# Patient Record
Sex: FEMALE | Race: WHITE | NOT HISPANIC OR LATINO | Employment: FULL TIME | URBAN - NONMETROPOLITAN AREA
[De-identification: names, ages, dates, MRNs, and addresses within clinical notes are randomized per-mention and may not be internally consistent; named-entity substitution may affect disease eponyms.]

---

## 2023-03-20 DIAGNOSIS — L98.9 SKIN LESION: Primary | ICD-10-CM

## 2024-01-04 ENCOUNTER — OFFICE VISIT (OUTPATIENT)
Dept: URGENT CARE | Facility: CLINIC | Age: 34
End: 2024-01-04
Payer: MEDICAID

## 2024-01-04 VITALS
HEART RATE: 72 BPM | RESPIRATION RATE: 16 BRPM | BODY MASS INDEX: 31.1 KG/M2 | OXYGEN SATURATION: 99 % | TEMPERATURE: 98.1 F | WEIGHT: 210 LBS | DIASTOLIC BLOOD PRESSURE: 128 MMHG | HEIGHT: 69 IN | SYSTOLIC BLOOD PRESSURE: 230 MMHG

## 2024-01-04 DIAGNOSIS — I10 UNCONTROLLED HYPERTENSION: ICD-10-CM

## 2024-01-04 DIAGNOSIS — J02.9 ACUTE PHARYNGITIS, UNSPECIFIED ETIOLOGY: Primary | ICD-10-CM

## 2024-01-04 PROCEDURE — 99213 OFFICE O/P EST LOW 20 MIN: CPT | Mod: 25 | Performed by: PHYSICIAN ASSISTANT

## 2024-01-04 PROCEDURE — 99212 OFFICE O/P EST SF 10 MIN: CPT | Mod: 25 | Performed by: PHYSICIAN ASSISTANT

## 2024-01-04 RX ORDER — AMOXICILLIN 500 MG/1
500 CAPSULE ORAL 2 TIMES DAILY
Qty: 20 CAPSULE | Refills: 0 | Status: SHIPPED | OUTPATIENT
Start: 2024-01-04 | End: 2024-01-14

## 2024-01-04 RX ORDER — LOSARTAN POTASSIUM 25 MG/1
25 TABLET ORAL DAILY
COMMUNITY

## 2024-01-04 NOTE — PROGRESS NOTES
[facility] [place] NOTE:      Name: Vipul Kennedy, 33 y.o.    CSN:[id number]   MRN:[id number]    PCP: Erika Ambrocio, WILLIAM-CNP    ALL:  No Known Allergies    History:    Chief Complaint: URI (SORE THROAT X TODAY)    Encounter Date: 2024  15:39hrs    HPI: The history was obtained from the patient. Vipul is a 33 y.o. female, who presents with a chief complaint of URI (SORE THROAT X TODAY)     Patient also mentions that she is not taking her blood pressure medication 1 out of negligence to adherence but also to because that she is just forgetful allegedly and not able to remind yourself to do so.    She denies any chest pain visual disturbance, denies any urinary complaints, denies any fever or chills.    PMHx:    Past Medical History:   Diagnosis Date    Dorsalgia, unspecified 2020    Upper back pain    Lumbago with sciatica, left side 2020    Chronic left-sided low back pain with left-sided sciatica    Lumbago with sciatica, right side 2020    Acute right-sided low back pain with right-sided sciatica    Other chest pain 2020    Atypical chest pain    Other specified health status     No pertinent past medical history    Personal history of other specified conditions 2019    History of palpitations    Personal history of other specified conditions 10/29/2020    History of right flank pain              Current Outpatient Medications   Medication Sig Dispense Refill    losartan (Cozaar) 25 mg tablet Take 1 tablet (25 mg) by mouth once daily.      amoxicillin (Amoxil) 500 mg capsule Take 1 capsule (500 mg) by mouth 2 times a day for 10 days. 20 capsule 0     No current facility-administered medications for this visit.         PMSx:    Past Surgical History:   Procedure Laterality Date    OTHER SURGICAL HISTORY  2019     section    OTHER SURGICAL HISTORY  2019    Tubal ligation bilateral       Fam Hx: No family history on file.    SOC. Hx:   "   Social History     Socioeconomic History    Marital status: Single     Spouse name: Not on file    Number of children: Not on file    Years of education: Not on file    Highest education level: Not on file   Occupational History    Not on file   Tobacco Use    Smoking status: Never    Smokeless tobacco: Never   Substance and Sexual Activity    Alcohol use: Yes    Drug use: Never    Sexual activity: Not on file   Other Topics Concern    Not on file   Social History Narrative    Not on file     Social Determinants of Health     Financial Resource Strain: Not on file   Food Insecurity: Not on file   Transportation Needs: Not on file   Physical Activity: Not on file   Stress: Not on file   Social Connections: Not on file   Intimate Partner Violence: Not on file   Housing Stability: Not on file         Vitals:    01/04/24 1536 01/04/24 1552   BP: (!) 215/146 (!) 230/128   BP Location:  Right arm   Patient Position:  Sitting   BP Cuff Size:  Adult   Pulse: 72    Resp: 16    Temp: 36.7 °C (98.1 °F)    SpO2: 99%    Weight: 95.3 kg (210 lb)    Height: 1.753 m (5' 9\")          95.3 kg (210 lb)          Physical Exam  Constitutional:       Appearance: Normal appearance. She is normal weight.   HENT:      Head: Normocephalic and atraumatic.      Nose: Nose normal.      Mouth/Throat:      Mouth: Mucous membranes are moist.      Pharynx: Uvula midline. Pharyngeal swelling, oropharyngeal exudate, posterior oropharyngeal erythema and uvula swelling present.      Tonsils: Tonsillar exudate present. No tonsillar abscesses. 1+ on the right. 1+ on the left.   Eyes:      Extraocular Movements: Extraocular movements intact.   Cardiovascular:      Rate and Rhythm: Normal rate and regular rhythm.   Pulmonary:      Effort: Pulmonary effort is normal.      Breath sounds: Normal breath sounds.   Abdominal:      General: Abdomen is flat.   Musculoskeletal:         General: Normal range of motion.      Cervical back: Normal range of motion " and neck supple.   Skin:     General: Skin is warm.   Neurological:      Mental Status: She is alert and oriented to person, place, and time.   Psychiatric:         Behavior: Behavior normal.            COURSE/MEDICAL DECISION MAKING:    Vipul is a 33 y.o., who presents with a working diagnosis of   1. Acute pharyngitis, unspecified etiology    2. Uncontrolled hypertension     with a differential to include: Influenza, parainfluenza, rhinovirus, adenovirus, metapneumovirus, coronavirus, COVID-19, postnasal drip, strep pharyngitis, GERD, retropharyngeal abscess, tonsillitis, adenitis, seasonal allergies            Dani Palomares PA-C   Advanced Practice Provider  [facility]

## 2024-07-02 ENCOUNTER — APPOINTMENT (OUTPATIENT)
Dept: PRIMARY CARE | Facility: CLINIC | Age: 34
End: 2024-07-02
Payer: MEDICAID

## 2024-07-02 VITALS
HEART RATE: 69 BPM | SYSTOLIC BLOOD PRESSURE: 184 MMHG | WEIGHT: 230.2 LBS | HEIGHT: 67 IN | BODY MASS INDEX: 36.13 KG/M2 | DIASTOLIC BLOOD PRESSURE: 139 MMHG

## 2024-07-02 DIAGNOSIS — R60.0 LOCALIZED EDEMA: ICD-10-CM

## 2024-07-02 DIAGNOSIS — I10 PRIMARY HYPERTENSION: Primary | ICD-10-CM

## 2024-07-02 DIAGNOSIS — Z13.6 SCREENING FOR CARDIOVASCULAR CONDITION: ICD-10-CM

## 2024-07-02 PROCEDURE — 99204 OFFICE O/P NEW MOD 45 MIN: CPT

## 2024-07-02 PROCEDURE — 3080F DIAST BP >= 90 MM HG: CPT

## 2024-07-02 PROCEDURE — 3077F SYST BP >= 140 MM HG: CPT

## 2024-07-02 PROCEDURE — 1036F TOBACCO NON-USER: CPT

## 2024-07-02 RX ORDER — LOSARTAN POTASSIUM 25 MG/1
25 TABLET ORAL DAILY
Qty: 30 TABLET | Refills: 3 | Status: SHIPPED | OUTPATIENT
Start: 2024-07-02 | End: 2024-10-30

## 2024-07-02 RX ORDER — FUROSEMIDE 20 MG/1
20 TABLET ORAL DAILY
Qty: 30 TABLET | Refills: 11 | Status: SHIPPED | OUTPATIENT
Start: 2024-07-02 | End: 2025-07-02

## 2024-07-02 ASSESSMENT — ENCOUNTER SYMPTOMS
VOMITING: 0
CHILLS: 0
NAUSEA: 0
HEMATURIA: 0
EYE REDNESS: 0
DIFFICULTY URINATING: 0
HALLUCINATIONS: 0
CONSTIPATION: 0
SINUS PAIN: 0
HEADACHES: 0
COUGH: 0
NUMBNESS: 0
SORE THROAT: 0
EYE PAIN: 0
WEAKNESS: 0
SINUS PRESSURE: 0
PALPITATIONS: 0
COLOR CHANGE: 0
DIZZINESS: 0
ABDOMINAL PAIN: 0
FEVER: 0
FLANK PAIN: 0
EYE ITCHING: 0
DIARRHEA: 0
SHORTNESS OF BREATH: 0
FATIGUE: 0
BACK PAIN: 0

## 2024-07-02 NOTE — PROGRESS NOTES
"Subjective   Patient ID: Vipul Kennedy is a 33 y.o. female who presents for Establish Care.    HPI   Here to establish care as new patient   Reports her ankles and feet swell  Quit smoking pot in October and has noticed weight gain and fluid retention since then.  Reports she has tried hydrochlorothiazide in the past with no improvement.     Review of Systems   Constitutional:  Negative for chills, fatigue and fever.   HENT:  Negative for congestion, sinus pressure, sinus pain and sore throat.    Eyes:  Negative for pain, redness and itching.   Respiratory:  Negative for cough and shortness of breath.    Cardiovascular:  Positive for leg swelling. Negative for chest pain and palpitations.   Gastrointestinal:  Negative for abdominal pain, constipation, diarrhea, nausea and vomiting.   Endocrine: Negative for cold intolerance and heat intolerance.   Genitourinary:  Negative for difficulty urinating, flank pain and hematuria.   Musculoskeletal:  Negative for back pain and gait problem.   Skin:  Negative for color change.   Neurological:  Negative for dizziness, weakness, numbness and headaches.   Psychiatric/Behavioral:  Negative for hallucinations and suicidal ideas.        Objective   BP (!) 184/139 (Patient Position: Sitting)   Pulse 69   Ht 1.703 m (5' 7.03\")   Wt 104 kg (230 lb 3.2 oz)   BMI 36.02 kg/m²     Physical Exam  Vitals and nursing note reviewed.   Constitutional:       Appearance: Normal appearance.   Cardiovascular:      Rate and Rhythm: Normal rate and regular rhythm.   Pulmonary:      Effort: Pulmonary effort is normal.      Breath sounds: Normal breath sounds.   Abdominal:      General: Abdomen is flat. Bowel sounds are normal.      Palpations: Abdomen is soft.   Skin:     General: Skin is warm and dry.      Capillary Refill: Capillary refill takes less than 2 seconds.   Neurological:      Mental Status: She is alert and oriented to person, place, and time.   Psychiatric:         Mood and " Affect: Mood normal.         Behavior: Behavior normal.         Assessment/Plan   Problem List Items Addressed This Visit    None  Visit Diagnoses         Codes    Primary hypertension    -  Primary I10    Relevant Medications    losartan (Cozaar) 25 mg tablet    Other Relevant Orders    CBC and Auto Differential    Comprehensive Metabolic Panel    Lipid Panel    Localized edema     R60.0    Relevant Medications    furosemide (Lasix) 20 mg tablet    Other Relevant Orders    TSH with reflex to Free T4 if abnormal    Screening for cardiovascular condition     Z13.6    Relevant Orders    CBC and Auto Differential    Hemoglobin A1C             HTN:   -Refill losartan 25 mg     Edema  -Furosemide 20mg PRN    Diet  -reports she has changed her diet and is eating vegetables and meat with only 5 lbs weight loss  -lab work ordered  -pending results discuss Ozempic/Trulicity

## 2024-07-15 ENCOUNTER — LAB (OUTPATIENT)
Dept: LAB | Facility: LAB | Age: 34
End: 2024-07-15
Payer: MEDICAID

## 2024-07-15 DIAGNOSIS — I10 PRIMARY HYPERTENSION: ICD-10-CM

## 2024-07-15 DIAGNOSIS — R60.0 LOCALIZED EDEMA: ICD-10-CM

## 2024-07-15 DIAGNOSIS — Z13.6 SCREENING FOR CARDIOVASCULAR CONDITION: ICD-10-CM

## 2024-07-15 LAB
ALBUMIN SERPL BCP-MCNC: 4.1 G/DL (ref 3.4–5)
ALP SERPL-CCNC: 45 U/L (ref 33–110)
ALT SERPL W P-5'-P-CCNC: 36 U/L (ref 7–45)
ANION GAP SERPL CALC-SCNC: 11 MMOL/L (ref 10–20)
AST SERPL W P-5'-P-CCNC: 19 U/L (ref 9–39)
BASOPHILS # BLD AUTO: 0.06 X10*3/UL (ref 0–0.1)
BASOPHILS NFR BLD AUTO: 0.7 %
BILIRUB SERPL-MCNC: 0.6 MG/DL (ref 0–1.2)
BUN SERPL-MCNC: 10 MG/DL (ref 6–23)
CALCIUM SERPL-MCNC: 9 MG/DL (ref 8.6–10.3)
CHLORIDE SERPL-SCNC: 104 MMOL/L (ref 98–107)
CHOLEST SERPL-MCNC: 189 MG/DL (ref 0–199)
CHOLESTEROL/HDL RATIO: 2.3
CO2 SERPL-SCNC: 26 MMOL/L (ref 21–32)
CREAT SERPL-MCNC: 0.74 MG/DL (ref 0.5–1.05)
EGFRCR SERPLBLD CKD-EPI 2021: >90 ML/MIN/1.73M*2
EOSINOPHIL # BLD AUTO: 0.28 X10*3/UL (ref 0–0.7)
EOSINOPHIL NFR BLD AUTO: 3.5 %
ERYTHROCYTE [DISTWIDTH] IN BLOOD BY AUTOMATED COUNT: 11.9 % (ref 11.5–14.5)
EST. AVERAGE GLUCOSE BLD GHB EST-MCNC: 97 MG/DL
GLUCOSE SERPL-MCNC: 94 MG/DL (ref 74–99)
HBA1C MFR BLD: 5 %
HCT VFR BLD AUTO: 41.8 % (ref 36–46)
HDLC SERPL-MCNC: 83 MG/DL
HGB BLD-MCNC: 13.7 G/DL (ref 12–16)
IMM GRANULOCYTES # BLD AUTO: 0.03 X10*3/UL (ref 0–0.7)
IMM GRANULOCYTES NFR BLD AUTO: 0.4 % (ref 0–0.9)
LDLC SERPL CALC-MCNC: 80 MG/DL
LYMPHOCYTES # BLD AUTO: 2.48 X10*3/UL (ref 1.2–4.8)
LYMPHOCYTES NFR BLD AUTO: 30.9 %
MCH RBC QN AUTO: 30.2 PG (ref 26–34)
MCHC RBC AUTO-ENTMCNC: 32.8 G/DL (ref 32–36)
MCV RBC AUTO: 92 FL (ref 80–100)
MONOCYTES # BLD AUTO: 0.66 X10*3/UL (ref 0.1–1)
MONOCYTES NFR BLD AUTO: 8.2 %
NEUTROPHILS # BLD AUTO: 4.51 X10*3/UL (ref 1.2–7.7)
NEUTROPHILS NFR BLD AUTO: 56.3 %
NON HDL CHOLESTEROL: 106 MG/DL (ref 0–149)
NRBC BLD-RTO: 0 /100 WBCS (ref 0–0)
PLATELET # BLD AUTO: 278 X10*3/UL (ref 150–450)
POTASSIUM SERPL-SCNC: 4.5 MMOL/L (ref 3.5–5.3)
PROT SERPL-MCNC: 6.8 G/DL (ref 6.4–8.2)
RBC # BLD AUTO: 4.53 X10*6/UL (ref 4–5.2)
SODIUM SERPL-SCNC: 136 MMOL/L (ref 136–145)
TRIGL SERPL-MCNC: 131 MG/DL (ref 0–149)
TSH SERPL-ACNC: 0.99 MIU/L (ref 0.44–3.98)
VLDL: 26 MG/DL (ref 0–40)
WBC # BLD AUTO: 8 X10*3/UL (ref 4.4–11.3)

## 2024-07-15 PROCEDURE — 80053 COMPREHEN METABOLIC PANEL: CPT

## 2024-07-15 PROCEDURE — 85025 COMPLETE CBC W/AUTO DIFF WBC: CPT

## 2024-07-15 PROCEDURE — 83036 HEMOGLOBIN GLYCOSYLATED A1C: CPT

## 2024-07-15 PROCEDURE — 84443 ASSAY THYROID STIM HORMONE: CPT

## 2024-07-15 PROCEDURE — 36415 COLL VENOUS BLD VENIPUNCTURE: CPT

## 2024-07-15 PROCEDURE — 80061 LIPID PANEL: CPT

## 2024-10-01 ENCOUNTER — APPOINTMENT (OUTPATIENT)
Dept: PRIMARY CARE | Facility: CLINIC | Age: 34
End: 2024-10-01
Payer: MEDICAID

## 2024-10-01 VITALS
HEART RATE: 81 BPM | HEIGHT: 67 IN | WEIGHT: 242.2 LBS | BODY MASS INDEX: 38.01 KG/M2 | DIASTOLIC BLOOD PRESSURE: 123 MMHG | SYSTOLIC BLOOD PRESSURE: 170 MMHG

## 2024-10-01 DIAGNOSIS — R60.0 PERIPHERAL EDEMA: ICD-10-CM

## 2024-10-01 DIAGNOSIS — R60.0 LOCALIZED EDEMA: ICD-10-CM

## 2024-10-01 DIAGNOSIS — E66.9 OBESITY (BMI 35.0-39.9 WITHOUT COMORBIDITY): Primary | ICD-10-CM

## 2024-10-01 DIAGNOSIS — I10 PRIMARY HYPERTENSION: ICD-10-CM

## 2024-10-01 PROCEDURE — 99214 OFFICE O/P EST MOD 30 MIN: CPT

## 2024-10-01 PROCEDURE — 1036F TOBACCO NON-USER: CPT

## 2024-10-01 PROCEDURE — 3008F BODY MASS INDEX DOCD: CPT

## 2024-10-01 PROCEDURE — 3080F DIAST BP >= 90 MM HG: CPT

## 2024-10-01 PROCEDURE — 3077F SYST BP >= 140 MM HG: CPT

## 2024-10-01 RX ORDER — FUROSEMIDE 20 MG/1
20 TABLET ORAL DAILY
Qty: 30 TABLET | Refills: 11 | Status: SHIPPED | OUTPATIENT
Start: 2024-10-01 | End: 2025-10-01

## 2024-10-01 RX ORDER — SEMAGLUTIDE 0.25 MG/.5ML
0.25 INJECTION, SOLUTION SUBCUTANEOUS WEEKLY
Qty: 2 ML | Refills: 0 | Status: SHIPPED | OUTPATIENT
Start: 2024-10-01 | End: 2024-10-23

## 2024-10-01 RX ORDER — LOSARTAN POTASSIUM 50 MG/1
50 TABLET ORAL DAILY
Qty: 100 TABLET | Refills: 3 | Status: SHIPPED | OUTPATIENT
Start: 2024-10-01 | End: 2025-11-05

## 2024-10-01 ASSESSMENT — ENCOUNTER SYMPTOMS
FEVER: 0
COUGH: 0
SHORTNESS OF BREATH: 0
FATIGUE: 0
CHILLS: 0
PALPITATIONS: 0
HEADACHES: 0

## 2024-10-01 NOTE — PROGRESS NOTES
"Subjective   Patient ID: Vipul Kennedy is a 34 y.o. female who presents for Follow-up.    HPI   Here today for 3 month follow up   Reports the peripheral edema has drastically improved with the lasix.   High blood pressure still, increased her losartan and encouraged her to check her BP at home.   Review of Systems   Constitutional:  Negative for chills, fatigue and fever.   Respiratory:  Negative for cough and shortness of breath.    Cardiovascular:  Negative for chest pain, palpitations and leg swelling.   Neurological:  Negative for headaches.       Objective   BP (!) 170/123 (Patient Position: Sitting)   Pulse 81   Ht 1.702 m (5' 7\")   Wt 110 kg (242 lb 3.2 oz)   BMI 37.93 kg/m²     Physical Exam  Cardiovascular:      Rate and Rhythm: Regular rhythm.      Heart sounds: Normal heart sounds.   Pulmonary:      Breath sounds: Normal breath sounds.   Abdominal:      General: Bowel sounds are normal.   Skin:     Capillary Refill: Capillary refill takes less than 2 seconds.   Neurological:      Mental Status: She is alert and oriented to person, place, and time.         Assessment/Plan   Problem List Items Addressed This Visit    None  Visit Diagnoses         Codes    Obesity (BMI 35.0-39.9 without comorbidity)    -  Primary E66.9    Relevant Medications    semaglutide, weight loss, (Wegovy) 0.25 mg/0.5 mL pen injector    Peripheral edema     R60.0    Primary hypertension     I10    Relevant Medications    losartan (Cozaar) 50 mg tablet    Localized edema     R60.0    Relevant Medications    furosemide (Lasix) 20 mg tablet             Weight loss  -Pt would like to try Wegovy for weight loss  -Educated on healthy diet and exercise     Hypertension  -Increase to 50mg daily     Peripheral edema  -Lasix 20 mg PRN   "

## 2025-01-07 ENCOUNTER — APPOINTMENT (OUTPATIENT)
Dept: PRIMARY CARE | Facility: CLINIC | Age: 35
End: 2025-01-07
Payer: MEDICAID

## 2025-01-07 DIAGNOSIS — K21.9 GASTROESOPHAGEAL REFLUX DISEASE WITHOUT ESOPHAGITIS: Primary | ICD-10-CM

## 2025-01-07 RX ORDER — OMEPRAZOLE 20 MG/1
20 CAPSULE, DELAYED RELEASE ORAL 2 TIMES DAILY
Qty: 60 CAPSULE | Refills: 0 | Status: SHIPPED | OUTPATIENT
Start: 2025-01-07 | End: 2025-02-06

## 2025-01-07 ASSESSMENT — ENCOUNTER SYMPTOMS
FATIGUE: 0
COUGH: 0
HEADACHES: 0
CHILLS: 0
ABDOMINAL PAIN: 0
FEVER: 0
LIGHT-HEADEDNESS: 0
PALPITATIONS: 0
SHORTNESS OF BREATH: 0

## 2025-01-07 NOTE — PROGRESS NOTES
Subjective   Patient ID: Vipul Kennedy is a 34 y.o. female who presents for No chief complaint on file..    HPI   Pt here for telephone encounter to discuss  Pt and physician are at two separate locations.   Pt was verbally consented for the encounter  . Phone call today regarding 3 month follow up   Acute concerns with GERD regardless of what she eats. Discussed foods that make the acid reflux worse. We will begin omeprazole for 2 weeks, if she still has reflux after omeprazole then we will send for EGD.   HTN, we increased her BP last visit, she will check her BP and let us know her readings, denies any chest pain, SOB or headaches.   Denies other concerns   Review of Systems   Constitutional:  Negative for chills, fatigue and fever.   Respiratory:  Negative for cough and shortness of breath.    Cardiovascular:  Negative for chest pain, palpitations and leg swelling.   Gastrointestinal:  Negative for abdominal pain.   Neurological:  Negative for light-headedness and headaches.       Objective   There were no vitals taken for this visit.    Physical Exam  Neurological:      Mental Status: She is alert and oriented to person, place, and time.         Assessment/Plan   Problem List Items Addressed This Visit    None  Visit Diagnoses         Codes    Gastroesophageal reflux disease without esophagitis    -  Primary K21.9    Relevant Medications    omeprazole (PriLOSEC) 20 mg DR capsule          GERD  -Begin omeprazole for 2 weeks  -If reflux persist will send for EGD   -Discussed foods that make reflux worse    HTN  -continue losartan 50 mg   -check BP and keep a log

## 2025-02-11 ENCOUNTER — APPOINTMENT (OUTPATIENT)
Dept: RADIOLOGY | Facility: HOSPITAL | Age: 35
End: 2025-02-11
Payer: MEDICAID

## 2025-02-11 ENCOUNTER — APPOINTMENT (OUTPATIENT)
Dept: CARDIOLOGY | Facility: HOSPITAL | Age: 35
End: 2025-02-11
Payer: MEDICAID

## 2025-02-11 ENCOUNTER — HOSPITAL ENCOUNTER (INPATIENT)
Facility: HOSPITAL | Age: 35
LOS: 3 days | Discharge: HOME | End: 2025-02-14
Attending: EMERGENCY MEDICINE | Admitting: STUDENT IN AN ORGANIZED HEALTH CARE EDUCATION/TRAINING PROGRAM
Payer: MEDICAID

## 2025-02-11 DIAGNOSIS — I16.0 HYPERTENSIVE URGENCY: ICD-10-CM

## 2025-02-11 DIAGNOSIS — I10 ESSENTIAL (PRIMARY) HYPERTENSION: ICD-10-CM

## 2025-02-11 DIAGNOSIS — I16.1 HYPERTENSIVE EMERGENCY: Primary | ICD-10-CM

## 2025-02-11 PROBLEM — R11.2 INTRACTABLE NAUSEA AND VOMITING: Status: ACTIVE | Noted: 2025-02-11

## 2025-02-11 LAB
ANION GAP SERPL CALC-SCNC: 19 MMOL/L (ref 10–20)
APPEARANCE UR: CLEAR
BASOPHILS # BLD AUTO: 0.06 X10*3/UL (ref 0–0.1)
BASOPHILS NFR BLD AUTO: 0.4 %
BILIRUB UR STRIP.AUTO-MCNC: NEGATIVE MG/DL
BUN SERPL-MCNC: 12 MG/DL (ref 6–23)
CALCIUM SERPL-MCNC: 10.3 MG/DL (ref 8.6–10.3)
CARDIAC TROPONIN I PNL SERPL HS: 7 NG/L (ref 0–13)
CARDIAC TROPONIN I PNL SERPL HS: 7 NG/L (ref 0–13)
CHLORIDE SERPL-SCNC: 101 MMOL/L (ref 98–107)
CO2 SERPL-SCNC: 20 MMOL/L (ref 21–32)
COLOR UR: ABNORMAL
CREAT SERPL-MCNC: 0.91 MG/DL (ref 0.5–1.05)
EGFRCR SERPLBLD CKD-EPI 2021: 86 ML/MIN/1.73M*2
EOSINOPHIL # BLD AUTO: 0.02 X10*3/UL (ref 0–0.7)
EOSINOPHIL NFR BLD AUTO: 0.1 %
ERYTHROCYTE [DISTWIDTH] IN BLOOD BY AUTOMATED COUNT: 12 % (ref 11.5–14.5)
FLUAV RNA RESP QL NAA+PROBE: NOT DETECTED
FLUBV RNA RESP QL NAA+PROBE: NOT DETECTED
GLUCOSE SERPL-MCNC: 130 MG/DL (ref 74–99)
GLUCOSE UR STRIP.AUTO-MCNC: ABNORMAL MG/DL
HCG UR QL IA.RAPID: NEGATIVE
HCT VFR BLD AUTO: 42.8 % (ref 36–46)
HGB BLD-MCNC: 15.1 G/DL (ref 12–16)
HOLD SPECIMEN: NORMAL
IMM GRANULOCYTES # BLD AUTO: 0.06 X10*3/UL (ref 0–0.7)
IMM GRANULOCYTES NFR BLD AUTO: 0.4 % (ref 0–0.9)
KETONES UR STRIP.AUTO-MCNC: ABNORMAL MG/DL
LEUKOCYTE ESTERASE UR QL STRIP.AUTO: NEGATIVE
LYMPHOCYTES # BLD AUTO: 1.75 X10*3/UL (ref 1.2–4.8)
LYMPHOCYTES NFR BLD AUTO: 12.9 %
MCH RBC QN AUTO: 30.6 PG (ref 26–34)
MCHC RBC AUTO-ENTMCNC: 35.3 G/DL (ref 32–36)
MCV RBC AUTO: 87 FL (ref 80–100)
MONOCYTES # BLD AUTO: 0.84 X10*3/UL (ref 0.1–1)
MONOCYTES NFR BLD AUTO: 6.2 %
MUCOUS THREADS #/AREA URNS AUTO: NORMAL /LPF
NEUTROPHILS # BLD AUTO: 10.86 X10*3/UL (ref 1.2–7.7)
NEUTROPHILS NFR BLD AUTO: 80 %
NITRITE UR QL STRIP.AUTO: NEGATIVE
NRBC BLD-RTO: 0 /100 WBCS (ref 0–0)
PH UR STRIP.AUTO: 8 [PH]
PLATELET # BLD AUTO: 325 X10*3/UL (ref 150–450)
POTASSIUM SERPL-SCNC: 3.3 MMOL/L (ref 3.5–5.3)
PROT UR STRIP.AUTO-MCNC: ABNORMAL MG/DL
RBC # BLD AUTO: 4.93 X10*6/UL (ref 4–5.2)
RBC # UR STRIP.AUTO: NEGATIVE MG/DL
RBC #/AREA URNS AUTO: NORMAL /HPF
SARS-COV-2 RNA RESP QL NAA+PROBE: NOT DETECTED
SODIUM SERPL-SCNC: 137 MMOL/L (ref 136–145)
SP GR UR STRIP.AUTO: 1.02
SQUAMOUS #/AREA URNS AUTO: NORMAL /HPF
UROBILINOGEN UR STRIP.AUTO-MCNC: NORMAL MG/DL
WBC # BLD AUTO: 13.6 X10*3/UL (ref 4.4–11.3)
WBC #/AREA URNS AUTO: NORMAL /HPF

## 2025-02-11 PROCEDURE — 71045 X-RAY EXAM CHEST 1 VIEW: CPT

## 2025-02-11 PROCEDURE — 81001 URINALYSIS AUTO W/SCOPE: CPT | Performed by: EMERGENCY MEDICINE

## 2025-02-11 PROCEDURE — 36415 COLL VENOUS BLD VENIPUNCTURE: CPT | Performed by: EMERGENCY MEDICINE

## 2025-02-11 PROCEDURE — 2500000004 HC RX 250 GENERAL PHARMACY W/ HCPCS (ALT 636 FOR OP/ED): Performed by: STUDENT IN AN ORGANIZED HEALTH CARE EDUCATION/TRAINING PROGRAM

## 2025-02-11 PROCEDURE — 84484 ASSAY OF TROPONIN QUANT: CPT | Performed by: EMERGENCY MEDICINE

## 2025-02-11 PROCEDURE — 93005 ELECTROCARDIOGRAM TRACING: CPT

## 2025-02-11 PROCEDURE — 2020000001 HC ICU ROOM DAILY

## 2025-02-11 PROCEDURE — 2500000001 HC RX 250 WO HCPCS SELF ADMINISTERED DRUGS (ALT 637 FOR MEDICARE OP): Performed by: EMERGENCY MEDICINE

## 2025-02-11 PROCEDURE — 2500000001 HC RX 250 WO HCPCS SELF ADMINISTERED DRUGS (ALT 637 FOR MEDICARE OP): Performed by: STUDENT IN AN ORGANIZED HEALTH CARE EDUCATION/TRAINING PROGRAM

## 2025-02-11 PROCEDURE — 85025 COMPLETE CBC W/AUTO DIFF WBC: CPT | Performed by: EMERGENCY MEDICINE

## 2025-02-11 PROCEDURE — 99285 EMERGENCY DEPT VISIT HI MDM: CPT | Mod: 25 | Performed by: EMERGENCY MEDICINE

## 2025-02-11 PROCEDURE — 96366 THER/PROPH/DIAG IV INF ADDON: CPT

## 2025-02-11 PROCEDURE — 99223 1ST HOSP IP/OBS HIGH 75: CPT | Performed by: STUDENT IN AN ORGANIZED HEALTH CARE EDUCATION/TRAINING PROGRAM

## 2025-02-11 PROCEDURE — 71045 X-RAY EXAM CHEST 1 VIEW: CPT | Performed by: RADIOLOGY

## 2025-02-11 PROCEDURE — 80048 BASIC METABOLIC PNL TOTAL CA: CPT | Performed by: EMERGENCY MEDICINE

## 2025-02-11 PROCEDURE — 2550000001 HC RX 255 CONTRASTS: Performed by: EMERGENCY MEDICINE

## 2025-02-11 PROCEDURE — 81025 URINE PREGNANCY TEST: CPT | Performed by: EMERGENCY MEDICINE

## 2025-02-11 PROCEDURE — 96375 TX/PRO/DX INJ NEW DRUG ADDON: CPT

## 2025-02-11 PROCEDURE — 2500000004 HC RX 250 GENERAL PHARMACY W/ HCPCS (ALT 636 FOR OP/ED): Performed by: EMERGENCY MEDICINE

## 2025-02-11 PROCEDURE — 96361 HYDRATE IV INFUSION ADD-ON: CPT

## 2025-02-11 PROCEDURE — 70450 CT HEAD/BRAIN W/O DYE: CPT | Performed by: RADIOLOGY

## 2025-02-11 PROCEDURE — 87636 SARSCOV2 & INF A&B AMP PRB: CPT | Performed by: EMERGENCY MEDICINE

## 2025-02-11 PROCEDURE — 96376 TX/PRO/DX INJ SAME DRUG ADON: CPT

## 2025-02-11 PROCEDURE — 70450 CT HEAD/BRAIN W/O DYE: CPT

## 2025-02-11 PROCEDURE — 74177 CT ABD & PELVIS W/CONTRAST: CPT

## 2025-02-11 PROCEDURE — 96365 THER/PROPH/DIAG IV INF INIT: CPT

## 2025-02-11 PROCEDURE — 74177 CT ABD & PELVIS W/CONTRAST: CPT | Performed by: RADIOLOGY

## 2025-02-11 RX ORDER — METOCLOPRAMIDE HYDROCHLORIDE 5 MG/ML
10 INJECTION INTRAMUSCULAR; INTRAVENOUS ONCE
Status: COMPLETED | OUTPATIENT
Start: 2025-02-11 | End: 2025-02-11

## 2025-02-11 RX ORDER — ONDANSETRON HYDROCHLORIDE 2 MG/ML
4 INJECTION, SOLUTION INTRAVENOUS ONCE
Status: COMPLETED | OUTPATIENT
Start: 2025-02-11 | End: 2025-02-11

## 2025-02-11 RX ORDER — LABETALOL HYDROCHLORIDE 5 MG/ML
20 INJECTION, SOLUTION INTRAVENOUS ONCE
Status: COMPLETED | OUTPATIENT
Start: 2025-02-11 | End: 2025-02-11

## 2025-02-11 RX ORDER — TRAZODONE HYDROCHLORIDE 50 MG/1
50 TABLET ORAL NIGHTLY PRN
Status: DISCONTINUED | OUTPATIENT
Start: 2025-02-11 | End: 2025-02-14 | Stop reason: HOSPADM

## 2025-02-11 RX ORDER — NICARDIPINE HYDROCHLORIDE 0.2 MG/ML
2.5-15 INJECTION INTRAVENOUS CONTINUOUS
Status: DISCONTINUED | OUTPATIENT
Start: 2025-02-11 | End: 2025-02-11

## 2025-02-11 RX ORDER — POLYETHYLENE GLYCOL 3350 17 G/17G
17 POWDER, FOR SOLUTION ORAL DAILY PRN
Status: DISCONTINUED | OUTPATIENT
Start: 2025-02-11 | End: 2025-02-14 | Stop reason: HOSPADM

## 2025-02-11 RX ORDER — ONDANSETRON HYDROCHLORIDE 2 MG/ML
4 INJECTION, SOLUTION INTRAVENOUS EVERY 6 HOURS PRN
Status: DISCONTINUED | OUTPATIENT
Start: 2025-02-11 | End: 2025-02-14 | Stop reason: HOSPADM

## 2025-02-11 RX ORDER — HEPARIN SODIUM 5000 [USP'U]/ML
5000 INJECTION, SOLUTION INTRAVENOUS; SUBCUTANEOUS EVERY 8 HOURS SCHEDULED
Status: DISCONTINUED | OUTPATIENT
Start: 2025-02-11 | End: 2025-02-14 | Stop reason: HOSPADM

## 2025-02-11 RX ORDER — FUROSEMIDE 20 MG/1
20 TABLET ORAL DAILY
Status: DISCONTINUED | OUTPATIENT
Start: 2025-02-11 | End: 2025-02-12

## 2025-02-11 RX ORDER — LOSARTAN POTASSIUM 50 MG/1
50 TABLET ORAL DAILY
Status: DISCONTINUED | OUTPATIENT
Start: 2025-02-11 | End: 2025-02-12

## 2025-02-11 RX ORDER — POTASSIUM CHLORIDE 14.9 MG/ML
20 INJECTION INTRAVENOUS
Status: COMPLETED | OUTPATIENT
Start: 2025-02-11 | End: 2025-02-11

## 2025-02-11 RX ORDER — CLONIDINE HYDROCHLORIDE 0.1 MG/1
0.3 TABLET ORAL ONCE
Status: COMPLETED | OUTPATIENT
Start: 2025-02-11 | End: 2025-02-11

## 2025-02-11 RX ORDER — NICARDIPINE HYDROCHLORIDE 0.2 MG/ML
2.5-15 INJECTION INTRAVENOUS CONTINUOUS
Status: DISCONTINUED | OUTPATIENT
Start: 2025-02-11 | End: 2025-02-13

## 2025-02-11 RX ADMIN — NICARDIPINE HYDROCHLORIDE 2.5 MG/HR: 0.2 INJECTION, SOLUTION INTRAVENOUS at 12:51

## 2025-02-11 RX ADMIN — POTASSIUM CHLORIDE 20 MEQ: 14.9 INJECTION, SOLUTION INTRAVENOUS at 13:38

## 2025-02-11 RX ADMIN — ONDANSETRON 4 MG: 2 INJECTION INTRAMUSCULAR; INTRAVENOUS at 14:55

## 2025-02-11 RX ADMIN — CLONIDINE HYDROCHLORIDE 0.3 MG: 0.1 TABLET ORAL at 11:06

## 2025-02-11 RX ADMIN — POTASSIUM CHLORIDE 20 MEQ: 14.9 INJECTION, SOLUTION INTRAVENOUS at 15:51

## 2025-02-11 RX ADMIN — LABETALOL HYDROCHLORIDE 20 MG: 5 INJECTION, SOLUTION INTRAVENOUS at 09:20

## 2025-02-11 RX ADMIN — LOSARTAN POTASSIUM 50 MG: 50 TABLET, FILM COATED ORAL at 18:01

## 2025-02-11 RX ADMIN — SODIUM CHLORIDE 1000 ML: 9 INJECTION, SOLUTION INTRAVENOUS at 09:30

## 2025-02-11 RX ADMIN — TRAZODONE HYDROCHLORIDE 50 MG: 50 TABLET ORAL at 23:10

## 2025-02-11 RX ADMIN — NICARDIPINE HYDROCHLORIDE 7.5 MG/HR: 0.2 INJECTION, SOLUTION INTRAVENOUS at 21:12

## 2025-02-11 RX ADMIN — IOHEXOL 68 ML: 350 INJECTION, SOLUTION INTRAVENOUS at 11:51

## 2025-02-11 RX ADMIN — ONDANSETRON 4 MG: 2 INJECTION INTRAMUSCULAR; INTRAVENOUS at 09:21

## 2025-02-11 RX ADMIN — HEPARIN SODIUM 5000 UNITS: 5000 INJECTION, SOLUTION INTRAVENOUS; SUBCUTANEOUS at 21:12

## 2025-02-11 RX ADMIN — ONDANSETRON 4 MG: 2 INJECTION INTRAMUSCULAR; INTRAVENOUS at 21:12

## 2025-02-11 RX ADMIN — SODIUM CHLORIDE 1000 ML: 9 INJECTION, SOLUTION INTRAVENOUS at 12:24

## 2025-02-11 RX ADMIN — METOCLOPRAMIDE 10 MG: 5 INJECTION, SOLUTION INTRAMUSCULAR; INTRAVENOUS at 12:24

## 2025-02-11 RX ADMIN — NICARDIPINE HYDROCHLORIDE 12.5 MG/HR: 0.2 INJECTION, SOLUTION INTRAVENOUS at 15:50

## 2025-02-11 SDOH — SOCIAL STABILITY: SOCIAL INSECURITY: ABUSE: ADULT

## 2025-02-11 SDOH — SOCIAL STABILITY: SOCIAL INSECURITY: DOES ANYONE TRY TO KEEP YOU FROM HAVING/CONTACTING OTHER FRIENDS OR DOING THINGS OUTSIDE YOUR HOME?: NO

## 2025-02-11 SDOH — SOCIAL STABILITY: SOCIAL INSECURITY: DO YOU FEEL ANYONE HAS EXPLOITED OR TAKEN ADVANTAGE OF YOU FINANCIALLY OR OF YOUR PERSONAL PROPERTY?: NO

## 2025-02-11 SDOH — SOCIAL STABILITY: SOCIAL INSECURITY: ARE YOU OR HAVE YOU BEEN THREATENED OR ABUSED PHYSICALLY, EMOTIONALLY, OR SEXUALLY BY ANYONE?: NO

## 2025-02-11 SDOH — SOCIAL STABILITY: SOCIAL INSECURITY: HAS ANYONE EVER THREATENED TO HURT YOUR FAMILY OR YOUR PETS?: NO

## 2025-02-11 SDOH — SOCIAL STABILITY: SOCIAL INSECURITY: WERE YOU ABLE TO COMPLETE ALL THE BEHAVIORAL HEALTH SCREENINGS?: YES

## 2025-02-11 SDOH — SOCIAL STABILITY: SOCIAL INSECURITY: DO YOU FEEL UNSAFE GOING BACK TO THE PLACE WHERE YOU ARE LIVING?: NO

## 2025-02-11 SDOH — SOCIAL STABILITY: SOCIAL INSECURITY: HAVE YOU HAD ANY THOUGHTS OF HARMING ANYONE ELSE?: NO

## 2025-02-11 SDOH — SOCIAL STABILITY: SOCIAL INSECURITY: HAVE YOU HAD THOUGHTS OF HARMING ANYONE ELSE?: NO

## 2025-02-11 SDOH — SOCIAL STABILITY: SOCIAL INSECURITY: ARE THERE ANY APPARENT SIGNS OF INJURIES/BEHAVIORS THAT COULD BE RELATED TO ABUSE/NEGLECT?: NO

## 2025-02-11 ASSESSMENT — COGNITIVE AND FUNCTIONAL STATUS - GENERAL
DAILY ACTIVITIY SCORE: 24
PATIENT BASELINE BEDBOUND: NO
MOBILITY SCORE: 24

## 2025-02-11 ASSESSMENT — PATIENT HEALTH QUESTIONNAIRE - PHQ9
1. LITTLE INTEREST OR PLEASURE IN DOING THINGS: NOT AT ALL
2. FEELING DOWN, DEPRESSED OR HOPELESS: NOT AT ALL
SUM OF ALL RESPONSES TO PHQ9 QUESTIONS 1 & 2: 0

## 2025-02-11 ASSESSMENT — PAIN - FUNCTIONAL ASSESSMENT
PAIN_FUNCTIONAL_ASSESSMENT: 0-10

## 2025-02-11 ASSESSMENT — COLUMBIA-SUICIDE SEVERITY RATING SCALE - C-SSRS
2. HAVE YOU ACTUALLY HAD ANY THOUGHTS OF KILLING YOURSELF?: NO
6. HAVE YOU EVER DONE ANYTHING, STARTED TO DO ANYTHING, OR PREPARED TO DO ANYTHING TO END YOUR LIFE?: NO
1. IN THE PAST MONTH, HAVE YOU WISHED YOU WERE DEAD OR WISHED YOU COULD GO TO SLEEP AND NOT WAKE UP?: NO

## 2025-02-11 ASSESSMENT — ACTIVITIES OF DAILY LIVING (ADL)
JUDGMENT_ADEQUATE_SAFELY_COMPLETE_DAILY_ACTIVITIES: YES
DRESSING YOURSELF: INDEPENDENT
FEEDING YOURSELF: INDEPENDENT
PATIENT'S MEMORY ADEQUATE TO SAFELY COMPLETE DAILY ACTIVITIES?: YES
WALKS IN HOME: INDEPENDENT
GROOMING: INDEPENDENT
ASSISTIVE_DEVICE: EYEGLASSES
TOILETING: INDEPENDENT
ADEQUATE_TO_COMPLETE_ADL: YES
BATHING: INDEPENDENT
HEARING - LEFT EAR: FUNCTIONAL
HEARING - RIGHT EAR: FUNCTIONAL

## 2025-02-11 ASSESSMENT — LIFESTYLE VARIABLES
PRESCIPTION_ABUSE_PAST_12_MONTHS: NO
AUDIT-C TOTAL SCORE: 0
HOW OFTEN DO YOU HAVE 6 OR MORE DRINKS ON ONE OCCASION: NEVER
SKIP TO QUESTIONS 9-10: 1
AUDIT-C TOTAL SCORE: 0
SUBSTANCE_ABUSE_PAST_12_MONTHS: NO
HOW MANY STANDARD DRINKS CONTAINING ALCOHOL DO YOU HAVE ON A TYPICAL DAY: PATIENT DOES NOT DRINK
HOW OFTEN DO YOU HAVE A DRINK CONTAINING ALCOHOL: NEVER

## 2025-02-11 ASSESSMENT — PAIN SCALES - GENERAL
PAINLEVEL_OUTOF10: 0 - NO PAIN

## 2025-02-11 NOTE — ASSESSMENT & PLAN NOTE
Possibly around initially related to the patient taking GLP-1 medication.  Continued symptoms may also be related to her hypertensive emergency.  She also had associated headache with this.  Nausea and vomiting has improved after control of blood pressure on Cardene drip.  Continue antiemetics as needed.

## 2025-02-11 NOTE — CARE PLAN
The patient's goals for the shift include rest    The clinical goals for the shift include sysdtolic tamika between 150-160  Success

## 2025-02-11 NOTE — ASSESSMENT & PLAN NOTE
Admitted to the ICU.  Continue Cardene drip with goal of systolic blood pressure of 160.  We will avoid overcorrection.  We will ordered the patient's home medications and wean off Cardene drip.  Will consult intensivist, Dr. Berrios.

## 2025-02-11 NOTE — ED PROVIDER NOTES
"HPI   Chief Complaint   Patient presents with    Nausea    Vomiting     Patient states she feels dehydrated.  Has been vomiting since  night.  No fevers or respiratory symptoms       Patient presents to the emergency department secondary to headache, vomiting with nausea, and concern for dehydration.  The patient states that she started a new medication this past Saturday, which was 3 days ago.  She states that this was a medication she ordered online for weight loss.  It is an injection type medication.  She states that she attempted to get this medication through her private physician however insurance would not cover it.  She states \"ever since I started that I have not been able to keep anything down\".  She is reporting intractable vomiting.  Has not taken or been able to keep down any of her medications including her blood pressure medications.  States she knows her blood pressure is elevated given her headache as she has had similar episodes in the past.  No known sick contacts.      History provided by:  Patient   used: No            Patient History   Past Medical History:   Diagnosis Date    Dorsalgia, unspecified 2020    Upper back pain    Lumbago with sciatica, left side 2020    Chronic left-sided low back pain with left-sided sciatica    Lumbago with sciatica, right side 2020    Acute right-sided low back pain with right-sided sciatica    Other chest pain 2020    Atypical chest pain    Other specified health status     No pertinent past medical history    Personal history of other specified conditions 2019    History of palpitations    Personal history of other specified conditions 10/29/2020    History of right flank pain     Past Surgical History:   Procedure Laterality Date    OTHER SURGICAL HISTORY  2019     section    OTHER SURGICAL HISTORY  2019    Tubal ligation bilateral     Family History   Problem Relation Name Age of " Onset    Hypertension Mother      Kidney disease Mother      Anxiety disorder Mother      Mental illness Father      Anxiety disorder Father       Social History     Tobacco Use    Smoking status: Never    Smokeless tobacco: Never   Vaping Use    Vaping status: Never Used   Substance Use Topics    Alcohol use: Yes    Drug use: Not Currently       Physical Exam   ED Triage Vitals   Temperature Heart Rate Respirations BP   02/11/25 0818 02/11/25 0824 02/11/25 0818 02/11/25 0832   36.7 °C (98 °F) 99 18 (!) 229/173      Pulse Ox Temp Source Heart Rate Source Patient Position   02/11/25 0818 02/11/25 0818 02/11/25 0818 02/11/25 0818   98 % Oral Monitor Lying      BP Location FiO2 (%)     02/11/25 0818 --     Left arm        Physical Exam  Vitals and nursing note reviewed.   Constitutional:       General: She is not in acute distress.     Appearance: Normal appearance. She is normal weight. She is not ill-appearing, toxic-appearing or diaphoretic.   HENT:      Head: Normocephalic and atraumatic.      Nose: Nose normal. No rhinorrhea.   Neck:      Comments: Trachea is midline  Cardiovascular:      Rate and Rhythm: Normal rate and regular rhythm.      Heart sounds: No murmur heard.  Pulmonary:      Effort: Pulmonary effort is normal.      Breath sounds: Normal breath sounds. No wheezing.   Abdominal:      General: Abdomen is flat. Bowel sounds are normal. There is no distension.      Palpations: Abdomen is soft.      Tenderness: There is no abdominal tenderness.   Musculoskeletal:         General: Normal range of motion.      Cervical back: Normal range of motion.   Skin:     General: Skin is warm and dry.      Findings: No rash.   Neurological:      General: No focal deficit present.      Mental Status: She is alert and oriented to person, place, and time. Mental status is at baseline.   Psychiatric:         Mood and Affect: Mood normal.         Behavior: Behavior normal.         Thought Content: Thought content normal.          Judgment: Judgment normal.           ED Course & MDM   Diagnoses as of 02/11/25 1252   Hypertensive urgency                 No data recorded     Alfonso Coma Scale Score: 15 (02/11/25 0822 : Marielos Mayberry RN)                           Medical Decision Making  Twelve-lead EKG was interpreted by myself and this was noted to contribute directly to patient care.  Study reveals a normal sinus rhythm at 82 bpm, leftward axis, normal R wave progression, no acute ischemic changes.    Patient's blood pressure was noted to be refractory to IV beta-blockers and oral clonidine.  Patient was started on a Cardene drip and will require admission for hypertensive urgency.  Patient case was discussed with the hospitalist who will admit to the MICU    Critical care time for this patient excluding billable procedures is 46 minutes secondary to multiple repeat physical examinations, chart review, and interpretation of radiographic studies.        Procedure  Procedures     Samuel Ferris DO  02/11/25 1252

## 2025-02-11 NOTE — H&P
History Of Present Illness  [place] KACI Kennedy is a 34 y.o. female with a past medical history of hypertension presenting with intractable nausea and vomiting.  She had recently started a new weight loss medication, Zepbound (tirzepatide), about 5 days ago.  Since, she has had continuously felt nauseous and throwing up.  She also has associated headache in the back of her head.  She last had a bowel movement 3 days ago.  She has associated chills and sweats shakes.  Her son was sick with the flu last week.  She denies fever, sweats, body aches, chest pain, dyspnea, palpitations, lightheadedness, abdominal pain, diarrhea, dysuria.  She has not taken her medications for 3 days.    Of note, patient has a longstanding history of hypertension.  She says she will run in the 200s she does not take her medications and will run in the 160s if she does take her medications.  She is on losartan and Lasix.  She only uses Lasix as needed for swelling.    Workup in the emergency department showed blood pressure significantly elevated at 250/145.  Patient was given IV labetalol and clonidine tablet 0.3 mg as well as 2 L of fluid.  CT head showed no acute intracranial process.  CT abdomen pelvis with IV contrast is pending a radiology review but from my independent review, there was no obvious intra-abdominal process.  CBC showed white blood cell count of 13.6, hemoglobin of 15.1, platelet count of 325.  Flu testing was negative.  SARS-CoV-2 testing was negative.  BMP showed bicarb of 20, potassium 3.3, serum creatinine 0.91.  Troponin was 7.  Urinalysis was not suggestive of infection but did show ketones.  hCG was negative.  Patient will be admitted to the ICU started on Cardene drip for hypertensive emergency.     Past Medical History  She has a past medical history of Dorsalgia, unspecified (02/20/2020), Lumbago with sciatica, left side (02/20/2020), Lumbago with sciatica, right side (11/09/2020), Other chest pain (02/20/2020),  Other specified health status, Personal history of other specified conditions (11/12/2019), and Personal history of other specified conditions (10/29/2020).    Surgical History  She has a past surgical history that includes Other surgical history (11/12/2019) and Other surgical history (11/12/2019).     Social History  She reports that she has never smoked. She has never used smokeless tobacco. She reports current alcohol use. She reports that she does not currently use drugs.    Family History  Family History   Problem Relation Name Age of Onset    Hypertension Mother      Kidney disease Mother      Anxiety disorder Mother      Mental illness Father      Anxiety disorder Father          Allergies  Patient has no known allergies.            Physical Exam  General:     Well appearing  HENT:      Head: Normocephalic and atraumatic.      Mouth: Mucous membranes are moist.   Eyes:      Pupils are equal, round, and reactive to light.   Cardiovascular:      Normal rate and regular rhythm. Normal S1, S2.  No murmurs, clicks, gallops.   Pulmonary:      Clear to auscultation bilaterally.  No wheezing, rhonchi, or crackles heard.   Abdominal:       Bowel sounds are normal.      Abdomen is obese, soft, nontender, nondistended  Skin:      No lesions seen  Musculoskeletal:         Extremities: No edema present. No deformities with no abnormal range of motion     Neck supple.   Neurological:      Mental Status: Patient is alert and oriented X3     CN II-XII intact.  No focal neurologic deficits appreciated.      Last Recorded Vitals  /82   Pulse (!) 108   Temp 36.7 °C (98 °F) (Oral)   Resp 16   Wt 109 kg (240 lb)   SpO2 99%     Relevant Results           Assessment/Plan   Assessment & Plan  Hypertensive emergency  Admitted to the ICU.  Continue Cardene drip with goal of systolic blood pressure of 160.  We will avoid overcorrection.  We will ordered the patient's home medications and wean off Cardene drip.  Will consult  intensivist, Dr. Berrios.  Intractable nausea and vomiting  Possibly around initially related to the patient taking GLP-1 medication.  Continued symptoms may also be related to her hypertensive emergency.  She also had associated headache with this.  Nausea and vomiting has improved after control of blood pressure on Cardene drip.  Continue antiemetics as needed.    3.  Leukocytosis: No obvious source of infection at this time.  May be reactive.  We will monitor off of antibiotics for now.  Follow-up CBC in the morning.    4.  Obese: BMI 36.49    5.  Active marijuana usage    6.  Hypokalemia: Potassium was 3.3 in emergency department.  She was given potassium replacement.  Follow-up BMP in the morning.         DVT ppx: Heparin  Diet: Regular    Luis Carlos Garg DO

## 2025-02-12 LAB
ANION GAP SERPL CALC-SCNC: 16 MMOL/L (ref 10–20)
ATRIAL RATE: 82 BPM
BASOPHILS # BLD AUTO: 0.04 X10*3/UL (ref 0–0.1)
BASOPHILS NFR BLD AUTO: 0.3 %
BUN SERPL-MCNC: 13 MG/DL (ref 6–23)
CALCIUM SERPL-MCNC: 9.5 MG/DL (ref 8.6–10.3)
CHLORIDE SERPL-SCNC: 106 MMOL/L (ref 98–107)
CO2 SERPL-SCNC: 17 MMOL/L (ref 21–32)
CREAT SERPL-MCNC: 0.72 MG/DL (ref 0.5–1.05)
EGFRCR SERPLBLD CKD-EPI 2021: >90 ML/MIN/1.73M*2
EOSINOPHIL # BLD AUTO: 0.01 X10*3/UL (ref 0–0.7)
EOSINOPHIL NFR BLD AUTO: 0.1 %
ERYTHROCYTE [DISTWIDTH] IN BLOOD BY AUTOMATED COUNT: 12.3 % (ref 11.5–14.5)
GLUCOSE SERPL-MCNC: 120 MG/DL (ref 74–99)
HCT VFR BLD AUTO: 40.3 % (ref 36–46)
HGB BLD-MCNC: 14 G/DL (ref 12–16)
IMM GRANULOCYTES # BLD AUTO: 0.09 X10*3/UL (ref 0–0.7)
IMM GRANULOCYTES NFR BLD AUTO: 0.6 % (ref 0–0.9)
LYMPHOCYTES # BLD AUTO: 1.86 X10*3/UL (ref 1.2–4.8)
LYMPHOCYTES NFR BLD AUTO: 12.3 %
MCH RBC QN AUTO: 30.4 PG (ref 26–34)
MCHC RBC AUTO-ENTMCNC: 34.7 G/DL (ref 32–36)
MCV RBC AUTO: 87 FL (ref 80–100)
MONOCYTES # BLD AUTO: 1.06 X10*3/UL (ref 0.1–1)
MONOCYTES NFR BLD AUTO: 7 %
NEUTROPHILS # BLD AUTO: 12.1 X10*3/UL (ref 1.2–7.7)
NEUTROPHILS NFR BLD AUTO: 79.7 %
NRBC BLD-RTO: 0 /100 WBCS (ref 0–0)
P AXIS: 24 DEGREES
P OFFSET: 199 MS
P ONSET: 152 MS
PLATELET # BLD AUTO: 290 X10*3/UL (ref 150–450)
POTASSIUM SERPL-SCNC: 3.2 MMOL/L (ref 3.5–5.3)
PR INTERVAL: 132 MS
Q ONSET: 218 MS
QRS COUNT: 14 BEATS
QRS DURATION: 84 MS
QT INTERVAL: 388 MS
QTC CALCULATION(BAZETT): 453 MS
QTC FREDERICIA: 430 MS
R AXIS: -3 DEGREES
RBC # BLD AUTO: 4.61 X10*6/UL (ref 4–5.2)
SODIUM SERPL-SCNC: 136 MMOL/L (ref 136–145)
T AXIS: 22 DEGREES
T OFFSET: 412 MS
VENTRICULAR RATE: 82 BPM
WBC # BLD AUTO: 15.2 X10*3/UL (ref 4.4–11.3)

## 2025-02-12 PROCEDURE — 2500000001 HC RX 250 WO HCPCS SELF ADMINISTERED DRUGS (ALT 637 FOR MEDICARE OP): Performed by: STUDENT IN AN ORGANIZED HEALTH CARE EDUCATION/TRAINING PROGRAM

## 2025-02-12 PROCEDURE — 80048 BASIC METABOLIC PNL TOTAL CA: CPT | Performed by: STUDENT IN AN ORGANIZED HEALTH CARE EDUCATION/TRAINING PROGRAM

## 2025-02-12 PROCEDURE — 2020000001 HC ICU ROOM DAILY

## 2025-02-12 PROCEDURE — 2500000004 HC RX 250 GENERAL PHARMACY W/ HCPCS (ALT 636 FOR OP/ED): Performed by: STUDENT IN AN ORGANIZED HEALTH CARE EDUCATION/TRAINING PROGRAM

## 2025-02-12 PROCEDURE — 99233 SBSQ HOSP IP/OBS HIGH 50: CPT | Performed by: STUDENT IN AN ORGANIZED HEALTH CARE EDUCATION/TRAINING PROGRAM

## 2025-02-12 PROCEDURE — 82384 ASSAY THREE CATECHOLAMINES: CPT | Performed by: INTERNAL MEDICINE

## 2025-02-12 PROCEDURE — 2500000002 HC RX 250 W HCPCS SELF ADMINISTERED DRUGS (ALT 637 FOR MEDICARE OP, ALT 636 FOR OP/ED): Performed by: STUDENT IN AN ORGANIZED HEALTH CARE EDUCATION/TRAINING PROGRAM

## 2025-02-12 PROCEDURE — 85025 COMPLETE CBC W/AUTO DIFF WBC: CPT | Performed by: STUDENT IN AN ORGANIZED HEALTH CARE EDUCATION/TRAINING PROGRAM

## 2025-02-12 PROCEDURE — 83835 ASSAY OF METANEPHRINES: CPT | Performed by: INTERNAL MEDICINE

## 2025-02-12 PROCEDURE — 82088 ASSAY OF ALDOSTERONE: CPT | Performed by: INTERNAL MEDICINE

## 2025-02-12 PROCEDURE — 84244 ASSAY OF RENIN: CPT | Performed by: INTERNAL MEDICINE

## 2025-02-12 PROCEDURE — 2500000001 HC RX 250 WO HCPCS SELF ADMINISTERED DRUGS (ALT 637 FOR MEDICARE OP): Performed by: INTERNAL MEDICINE

## 2025-02-12 PROCEDURE — 36415 COLL VENOUS BLD VENIPUNCTURE: CPT | Performed by: INTERNAL MEDICINE

## 2025-02-12 PROCEDURE — 99254 IP/OBS CNSLTJ NEW/EST MOD 60: CPT | Performed by: INTERNAL MEDICINE

## 2025-02-12 PROCEDURE — 36415 COLL VENOUS BLD VENIPUNCTURE: CPT | Performed by: STUDENT IN AN ORGANIZED HEALTH CARE EDUCATION/TRAINING PROGRAM

## 2025-02-12 RX ORDER — PANTOPRAZOLE SODIUM 40 MG/10ML
40 INJECTION, POWDER, LYOPHILIZED, FOR SOLUTION INTRAVENOUS DAILY
Status: DISCONTINUED | OUTPATIENT
Start: 2025-02-12 | End: 2025-02-14 | Stop reason: HOSPADM

## 2025-02-12 RX ORDER — PROCHLORPERAZINE EDISYLATE 5 MG/ML
10 INJECTION INTRAMUSCULAR; INTRAVENOUS EVERY 6 HOURS PRN
Status: DISCONTINUED | OUTPATIENT
Start: 2025-02-12 | End: 2025-02-14 | Stop reason: HOSPADM

## 2025-02-12 RX ORDER — LOSARTAN POTASSIUM 100 MG/1
100 TABLET ORAL DAILY
Status: DISCONTINUED | OUTPATIENT
Start: 2025-02-13 | End: 2025-02-12

## 2025-02-12 RX ORDER — POTASSIUM CHLORIDE 20 MEQ/1
40 TABLET, EXTENDED RELEASE ORAL ONCE
Status: COMPLETED | OUTPATIENT
Start: 2025-02-12 | End: 2025-02-12

## 2025-02-12 RX ORDER — LABETALOL 200 MG/1
100 TABLET, FILM COATED ORAL 2 TIMES DAILY
Status: DISCONTINUED | OUTPATIENT
Start: 2025-02-12 | End: 2025-02-13

## 2025-02-12 RX ORDER — LOSARTAN POTASSIUM 50 MG/1
50 TABLET ORAL ONCE
Status: COMPLETED | OUTPATIENT
Start: 2025-02-12 | End: 2025-02-12

## 2025-02-12 RX ORDER — AMLODIPINE BESYLATE 5 MG/1
5 TABLET ORAL DAILY
Status: DISCONTINUED | OUTPATIENT
Start: 2025-02-12 | End: 2025-02-12

## 2025-02-12 RX ADMIN — NICARDIPINE HYDROCHLORIDE 10 MG/HR: 0.2 INJECTION, SOLUTION INTRAVENOUS at 01:52

## 2025-02-12 RX ADMIN — PANTOPRAZOLE SODIUM 40 MG: 40 INJECTION, POWDER, FOR SOLUTION INTRAVENOUS at 12:01

## 2025-02-12 RX ADMIN — LOSARTAN POTASSIUM 50 MG: 50 TABLET, FILM COATED ORAL at 10:45

## 2025-02-12 RX ADMIN — AMLODIPINE BESYLATE 5 MG: 5 TABLET ORAL at 10:45

## 2025-02-12 RX ADMIN — LOSARTAN POTASSIUM 50 MG: 50 TABLET, FILM COATED ORAL at 08:06

## 2025-02-12 RX ADMIN — LABETALOL HYDROCHLORIDE 100 MG: 200 TABLET, FILM COATED ORAL at 18:07

## 2025-02-12 RX ADMIN — ONDANSETRON 4 MG: 2 INJECTION INTRAMUSCULAR; INTRAVENOUS at 10:51

## 2025-02-12 RX ADMIN — POTASSIUM CHLORIDE 40 MEQ: 1500 TABLET, EXTENDED RELEASE ORAL at 18:07

## 2025-02-12 RX ADMIN — HEPARIN SODIUM 5000 UNITS: 5000 INJECTION, SOLUTION INTRAVENOUS; SUBCUTANEOUS at 15:00

## 2025-02-12 RX ADMIN — PROCHLORPERAZINE EDISYLATE 10 MG: 5 INJECTION INTRAMUSCULAR; INTRAVENOUS at 12:00

## 2025-02-12 RX ADMIN — ONDANSETRON 4 MG: 2 INJECTION INTRAMUSCULAR; INTRAVENOUS at 03:53

## 2025-02-12 RX ADMIN — NICARDIPINE HYDROCHLORIDE 7.5 MG/HR: 0.2 INJECTION, SOLUTION INTRAVENOUS at 05:02

## 2025-02-12 RX ADMIN — NICARDIPINE HYDROCHLORIDE 5 MG/HR: 0.2 INJECTION, SOLUTION INTRAVENOUS at 11:25

## 2025-02-12 SDOH — SOCIAL STABILITY: SOCIAL INSECURITY
WITHIN THE LAST YEAR, HAVE YOU BEEN KICKED, HIT, SLAPPED, OR OTHERWISE PHYSICALLY HURT BY YOUR PARTNER OR EX-PARTNER?: NO

## 2025-02-12 SDOH — SOCIAL STABILITY: SOCIAL INSECURITY: WITHIN THE LAST YEAR, HAVE YOU BEEN HUMILIATED OR EMOTIONALLY ABUSED IN OTHER WAYS BY YOUR PARTNER OR EX-PARTNER?: NO

## 2025-02-12 SDOH — HEALTH STABILITY: PHYSICAL HEALTH
HOW OFTEN DO YOU NEED TO HAVE SOMEONE HELP YOU WHEN YOU READ INSTRUCTIONS, PAMPHLETS, OR OTHER WRITTEN MATERIAL FROM YOUR DOCTOR OR PHARMACY?: NEVER

## 2025-02-12 SDOH — SOCIAL STABILITY: SOCIAL INSECURITY: WITHIN THE LAST YEAR, HAVE YOU BEEN AFRAID OF YOUR PARTNER OR EX-PARTNER?: NO

## 2025-02-12 SDOH — ECONOMIC STABILITY: INCOME INSECURITY: IN THE PAST 12 MONTHS HAS THE ELECTRIC, GAS, OIL, OR WATER COMPANY THREATENED TO SHUT OFF SERVICES IN YOUR HOME?: NO

## 2025-02-12 SDOH — ECONOMIC STABILITY: HOUSING INSECURITY: AT ANY TIME IN THE PAST 12 MONTHS, WERE YOU HOMELESS OR LIVING IN A SHELTER (INCLUDING NOW)?: PATIENT DECLINED

## 2025-02-12 SDOH — ECONOMIC STABILITY: FOOD INSECURITY
WITHIN THE PAST 12 MONTHS, YOU WORRIED THAT YOUR FOOD WOULD RUN OUT BEFORE YOU GOT THE MONEY TO BUY MORE.: PATIENT DECLINED

## 2025-02-12 SDOH — ECONOMIC STABILITY: TRANSPORTATION INSECURITY
IN THE PAST 12 MONTHS, HAS LACK OF TRANSPORTATION KEPT YOU FROM MEDICAL APPOINTMENTS OR FROM GETTING MEDICATIONS?: PATIENT DECLINED

## 2025-02-12 SDOH — ECONOMIC STABILITY: FOOD INSECURITY: WITHIN THE PAST 12 MONTHS, THE FOOD YOU BOUGHT JUST DIDN'T LAST AND YOU DIDN'T HAVE MONEY TO GET MORE.: PATIENT DECLINED

## 2025-02-12 SDOH — SOCIAL STABILITY: SOCIAL INSECURITY
WITHIN THE LAST YEAR, HAVE YOU BEEN RAPED OR FORCED TO HAVE ANY KIND OF SEXUAL ACTIVITY BY YOUR PARTNER OR EX-PARTNER?: NO

## 2025-02-12 SDOH — ECONOMIC STABILITY: FOOD INSECURITY: HOW HARD IS IT FOR YOU TO PAY FOR THE VERY BASICS LIKE FOOD, HOUSING, MEDICAL CARE, AND HEATING?: PATIENT DECLINED

## 2025-02-12 SDOH — ECONOMIC STABILITY: HOUSING INSECURITY: IN THE LAST 12 MONTHS, WAS THERE A TIME WHEN YOU WERE NOT ABLE TO PAY THE MORTGAGE OR RENT ON TIME?: PATIENT DECLINED

## 2025-02-12 ASSESSMENT — COGNITIVE AND FUNCTIONAL STATUS - GENERAL
MOBILITY SCORE: 24
DAILY ACTIVITIY SCORE: 24

## 2025-02-12 ASSESSMENT — PAIN SCALES - GENERAL
PAINLEVEL_OUTOF10: 0 - NO PAIN
PAINLEVEL_OUTOF10: 0 - NO PAIN

## 2025-02-12 ASSESSMENT — ACTIVITIES OF DAILY LIVING (ADL)
LACK_OF_TRANSPORTATION: NO
LACK_OF_TRANSPORTATION: PATIENT DECLINED

## 2025-02-12 ASSESSMENT — PAIN - FUNCTIONAL ASSESSMENT
PAIN_FUNCTIONAL_ASSESSMENT: 0-10
PAIN_FUNCTIONAL_ASSESSMENT: 0-10

## 2025-02-12 NOTE — PROGRESS NOTES
02/12/25 1124   Discharge Planning   Living Arrangements Children;Spouse/significant other   Support Systems Spouse/significant other;Children;Parent   Assistance Needed none   Type of Residence Private residence   Number of Stairs to Enter Residence 5   Number of Stairs Within Residence 9   Do you have animals or pets at home? Yes   Type of Animals or Pets dog   Who is requesting discharge planning? Provider   Home or Post Acute Services None   Expected Discharge Disposition Home   Does the patient need discharge transport arranged? No   Financial Resource Strain   How hard is it for you to pay for the very basics like food, housing, medical care, and heating? Not very   Housing Stability   In the last 12 months, was there a time when you were not able to pay the mortgage or rent on time? N   In the past 12 months, how many times have you moved where you were living? 0   At any time in the past 12 months, were you homeless or living in a shelter (including now)? N   Transportation Needs   In the past 12 months, has lack of transportation kept you from medical appointments or from getting medications? no   In the past 12 months, has lack of transportation kept you from meetings, work, or from getting things needed for daily living? No   Stroke Family Assessment   Stroke Family Assessment Needed No   Intensity of Service   Intensity of Service 0-30 min     Met with pt at the bedside and verified address, phone number and emergency contact information. PCP is Nataly Hatch NP and preferred pharmacy is [facility], denies issues obtaining or affording medications and takes as ordered. Pt is not diabetic, does not use DME or oxygen and there is no agency involvement. Pt is independent, lives at home with significant other and children and feels safe.  Plan is to return home no new needs at discharge. [facility] 24/24. ADOD 2/13 . Care Transitions to follow. Christine Munoz BSN/RN-TCC

## 2025-02-12 NOTE — CARE PLAN
The patient's goals for the shift include rest    The clinical goals for the shift include Patient will be off drip by end of shift

## 2025-02-12 NOTE — ASSESSMENT & PLAN NOTE
Possibly initially related to the patient taking GLP-1 medication.  Continued symptoms may also be related to her hypertensive emergency.  She also had associated headache with this.  Nausea and vomiting has improved after control of blood pressure on Cardene drip.  Continue antiemetics as needed.

## 2025-02-12 NOTE — ASSESSMENT & PLAN NOTE
Admitted to the ICU.  Continue Cardene drip with goal of systolic blood pressure of 160.  Nephrology is consulted.  They have started the patient on labetalol 100 mg twice daily.  Continue weaning off of Cardene drip.  Secondary hypertension workup has been initiated.

## 2025-02-12 NOTE — CARE PLAN
The clinical goals for the shift include systolic blood pressure between 150-160 through shift      Problem: Pain - Adult  Goal: Verbalizes/displays adequate comfort level or baseline comfort level  Outcome: Progressing     Problem: Safety - Adult  Goal: Free from fall injury  Outcome: Progressing

## 2025-02-12 NOTE — PROGRESS NOTES
Vipul Kennedy is a 34 y.o. female on day 1 of admission presenting with Hypertensive emergency.      Subjective   Patient seen and examined at bedside.  She remains on Cardene drip.  I discussed with her increasing her blood pressure medications.  She also continues to have issues with nausea and vomiting.    Case was discussed with nephrologist, Dr. Berrios.  He is starting secondary hypertension workup.           Objective     Last Recorded Vitals  BP (!) 140/92   Pulse 96   Temp 36.5 °C (97.7 °F) (Temporal)   Resp 16   Wt 104 kg (230 lb 2.6 oz)   SpO2 99%   Intake/Output last 3 Shifts:    Intake/Output Summary (Last 24 hours) at 2/12/2025 1659  Last data filed at 2/12/2025 1110  Gross per 24 hour   Intake 1894.78 ml   Output 500 ml   Net 1394.78 ml       Admission Weight  Weight: 109 kg (240 lb) (02/11/25 0818)    Daily Weight  02/11/25 : 104 kg (230 lb 2.6 oz)    Image Results  ECG 12 lead  Normal sinus rhythm  Minimal voltage criteria for LVH, may be normal variant ( R in aVL )  Borderline ECG  No previous ECGs available    See ED provider note for full interpretation and clinical correlation  Confirmed by Marycarmen Teixeira ([id number]) on 2/12/2025 11:43:55 AM        Physical exam:  General:     Well appearing  HENT:      Head: Normocephalic and atraumatic.      Mouth: Mucous membranes are moist.   Eyes:      Pupils are equal, round, and reactive to light.   Cardiovascular:      Normal rate and regular rhythm. Normal S1, S2.  No murmurs, clicks, gallops.   Pulmonary:      Clear to auscultation bilaterally.  No wheezing, rhonchi, or crackles heard.   Abdominal:       Bowel sounds are normal.      Abdomen is obese, soft, nontender, nondistended  Skin:      No lesions seen  Musculoskeletal:         Extremities: No edema present. No deformities with no abnormal range of motion     Neck supple.   Neurological:      Mental Status: Patient is alert and oriented X3     CN II-XII intact.  No focal neurologic deficits  appreciated.     Relevant Results               Assessment/Plan                  Assessment & Plan  Hypertensive emergency  Admitted to the ICU.  Continue Cardene drip with goal of systolic blood pressure of 160.  Nephrology is consulted.  They have started the patient on labetalol 100 mg twice daily.  Continue weaning off of Cardene drip.  Secondary hypertension workup has been initiated.  Intractable nausea and vomiting  Possibly initially related to the patient taking GLP-1 medication.  Continued symptoms may also be related to her hypertensive emergency.  She also had associated headache with this.  Nausea and vomiting has improved after control of blood pressure on Cardene drip.  Continue antiemetics as needed.  3.  Hypokalemia: Give p.o. potassium chloride 40 mEq at this time.  May be related to GI losses.    4.  Leukocytosis: White blood cell count still elevated.  Still no sign of infection at this time.  Likely reactive.  Monitor off antibiotics.    5.  Active marijuana usage    6.  Obese: BMI 36.49              DVT ppx: Heparin  Diet: Regular    Disposition: Continue making blood pressure adjustments.  Continue antiemetics.  Anticipate patient remain hospitalized for additional 24 to 48 hours.    Luis Carlos Gagr DO

## 2025-02-12 NOTE — CONSULTS
Reason For Consult  Hypertension    History Of Present Illness  Vipul Kennedy is a 34 y.o. female presenting with nausea and vomiting.   She presented to the emergency room secondary to nausea and vomiting.  She has been feeling quite ill.  She has been feeling flushed during these time she can also feel her heart racing  She recently started a GLP-1 secondary to the goal of losing weight.  She has felt quite ill since starting that medication.  She does have a longtime history of high blood pressure she was on losartan.  She states her blood pressure has been quite elevated for a very long period of time.  When she arrived to the emergency room her blood pressure was 250/145  Today she states she is still feeling nauseated  She denies any headaches  She is tachycardic when I was seeing her  Past Medical History  She has a past medical history of Dorsalgia, unspecified (02/20/2020), HTN (hypertension), Lumbago with sciatica, left side (02/20/2020), Lumbago with sciatica, right side (11/09/2020), Other chest pain (02/20/2020), Other specified health status, Personal history of other specified conditions (11/12/2019), and Personal history of other specified conditions (10/29/2020).    Surgical History  She has a past surgical history that includes Other surgical history (11/12/2019) and Other surgical history (11/12/2019).     Social History  She reports that she has never smoked. She has never used smokeless tobacco. She reports current alcohol use. She reports current drug use. Drug: Marijuana.    Family History  Family History   Problem Relation Name Age of Onset    Hypertension Mother      Kidney disease Mother      Anxiety disorder Mother      Mental illness Father      Anxiety disorder Father          Allergies  Patient has no known allergies.    Review of Systems  A full 10 point review of systems was obtained is negative except HPI as above     Physical Exam  Physical Exam  Constitutional:       Appearance:  "Normal appearance.   HENT:      Head: Normocephalic and atraumatic.      Right Ear: External ear normal.      Left Ear: External ear normal.      Nose: Nose normal.      Mouth/Throat:      Mouth: Mucous membranes are moist.      Pharynx: Oropharynx is clear.   Eyes:      Extraocular Movements: Extraocular movements intact.      Conjunctiva/sclera: Conjunctivae normal.      Pupils: Pupils are equal, round, and reactive to light.   Cardiovascular:      Rate and Rhythm: Regular rhythm. Tachycardia present.   Pulmonary:      Effort: Pulmonary effort is normal.      Breath sounds: Normal breath sounds.   Abdominal:      General: Abdomen is flat.      Palpations: Abdomen is soft.   Skin:     General: Skin is warm and dry.   Neurological:      General: No focal deficit present.      Mental Status: She is alert and oriented to person, place, and time.   Psychiatric:         Mood and Affect: Mood normal.         Behavior: Behavior normal.                 I&O 24HR    Intake/Output Summary (Last 24 hours) at 2/12/2025 1532  Last data filed at 2/12/2025 1110  Gross per 24 hour   Intake 1894.78 ml   Output 500 ml   Net 1394.78 ml       Vitals 24HR  Heart Rate:  []   Temp:  [36 °C (96.8 °F)-36.6 °C (97.9 °F)]   Resp:  [13-26]   BP: (124-174)/()   Height:  [172.7 cm (5' 7.99\")]   Weight:  [104 kg (230 lb 2.6 oz)]   SpO2:  [97 %-100 %]     Scheduled medications  amLODIPine, 5 mg, oral, Daily  heparin (porcine), 5,000 Units, subcutaneous, q8h DONNY  [START ON 2/13/2025] losartan, 100 mg, oral, Daily  pantoprazole, 40 mg, intravenous, Daily      Continuous medications  niCARdipine, 2.5-15 mg/hr, Last Rate: 2.5 mg/hr (02/12/25 1250)      PRN medications  PRN medications: ondansetron, polyethylene glycol, prochlorperazine, traZODone      Relevant Results  Results reviewed     Assessment/Plan       Uncontrolled hypertension without evidence of endorgan damage at this time  Hypokalemia  Normal anion gap metabolic " acidosis  Nausea and vomiting    Plan:  We will go ahead and work her up for secondary causes of her hypertension.  I will attempt to order a renal artery Doppler  We will get renin and aldosterone levels  We will also get catecholamines and metanephrines  She is tachycardic.  I am going to go ahead and start her on labetalol orally  I will discontinue amlodipine for now  Wean nicardipine drip once these medications have been started  We will work on getting her onto a stable regimen to keep blood pressure under control and rule out secondary reasons  She should stay away from GLP-1's  Thanks for the consult    Assessment & Plan  Hypertensive emergency    Intractable nausea and vomiting            Son Berrios, DO

## 2025-02-13 ENCOUNTER — APPOINTMENT (OUTPATIENT)
Dept: VASCULAR MEDICINE | Facility: HOSPITAL | Age: 35
End: 2025-02-13
Payer: MEDICAID

## 2025-02-13 LAB
ALBUMIN SERPL BCP-MCNC: 4.4 G/DL (ref 3.4–5)
ALP SERPL-CCNC: 40 U/L (ref 33–110)
ALT SERPL W P-5'-P-CCNC: 19 U/L (ref 7–45)
ANION GAP SERPL CALC-SCNC: 12 MMOL/L (ref 10–20)
AST SERPL W P-5'-P-CCNC: 13 U/L (ref 9–39)
BILIRUB SERPL-MCNC: 0.6 MG/DL (ref 0–1.2)
BUN SERPL-MCNC: 22 MG/DL (ref 6–23)
CALCIUM SERPL-MCNC: 9.5 MG/DL (ref 8.6–10.3)
CHLORIDE SERPL-SCNC: 101 MMOL/L (ref 98–107)
CO2 SERPL-SCNC: 24 MMOL/L (ref 21–32)
CREAT SERPL-MCNC: 0.96 MG/DL (ref 0.5–1.05)
EGFRCR SERPLBLD CKD-EPI 2021: 81 ML/MIN/1.73M*2
GLUCOSE SERPL-MCNC: 89 MG/DL (ref 74–99)
POTASSIUM SERPL-SCNC: 3.3 MMOL/L (ref 3.5–5.3)
PROT SERPL-MCNC: 7.7 G/DL (ref 6.4–8.2)
SODIUM SERPL-SCNC: 134 MMOL/L (ref 136–145)

## 2025-02-13 PROCEDURE — 2500000001 HC RX 250 WO HCPCS SELF ADMINISTERED DRUGS (ALT 637 FOR MEDICARE OP): Performed by: INTERNAL MEDICINE

## 2025-02-13 PROCEDURE — 2500000001 HC RX 250 WO HCPCS SELF ADMINISTERED DRUGS (ALT 637 FOR MEDICARE OP): Performed by: STUDENT IN AN ORGANIZED HEALTH CARE EDUCATION/TRAINING PROGRAM

## 2025-02-13 PROCEDURE — 36415 COLL VENOUS BLD VENIPUNCTURE: CPT | Performed by: INTERNAL MEDICINE

## 2025-02-13 PROCEDURE — 1100000001 HC PRIVATE ROOM DAILY

## 2025-02-13 PROCEDURE — 93975 VASCULAR STUDY: CPT | Performed by: SURGERY

## 2025-02-13 PROCEDURE — 93975 VASCULAR STUDY: CPT

## 2025-02-13 PROCEDURE — 80053 COMPREHEN METABOLIC PANEL: CPT | Performed by: INTERNAL MEDICINE

## 2025-02-13 PROCEDURE — 99233 SBSQ HOSP IP/OBS HIGH 50: CPT | Performed by: STUDENT IN AN ORGANIZED HEALTH CARE EDUCATION/TRAINING PROGRAM

## 2025-02-13 PROCEDURE — 2500000004 HC RX 250 GENERAL PHARMACY W/ HCPCS (ALT 636 FOR OP/ED): Performed by: STUDENT IN AN ORGANIZED HEALTH CARE EDUCATION/TRAINING PROGRAM

## 2025-02-13 PROCEDURE — 99233 SBSQ HOSP IP/OBS HIGH 50: CPT | Performed by: INTERNAL MEDICINE

## 2025-02-13 RX ORDER — CALCIUM CARBONATE 200(500)MG
1000 TABLET,CHEWABLE ORAL 4 TIMES DAILY PRN
Status: DISCONTINUED | OUTPATIENT
Start: 2025-02-13 | End: 2025-02-14 | Stop reason: HOSPADM

## 2025-02-13 RX ORDER — LABETALOL 200 MG/1
100 TABLET, FILM COATED ORAL ONCE
Status: COMPLETED | OUTPATIENT
Start: 2025-02-13 | End: 2025-02-13

## 2025-02-13 RX ORDER — HYDRALAZINE HYDROCHLORIDE 20 MG/ML
10 INJECTION INTRAMUSCULAR; INTRAVENOUS EVERY 6 HOURS PRN
Status: DISCONTINUED | OUTPATIENT
Start: 2025-02-13 | End: 2025-02-14 | Stop reason: HOSPADM

## 2025-02-13 RX ORDER — LABETALOL 200 MG/1
200 TABLET, FILM COATED ORAL 2 TIMES DAILY
Status: DISCONTINUED | OUTPATIENT
Start: 2025-02-13 | End: 2025-02-14 | Stop reason: HOSPADM

## 2025-02-13 RX ADMIN — PANTOPRAZOLE SODIUM 40 MG: 40 INJECTION, POWDER, FOR SOLUTION INTRAVENOUS at 08:18

## 2025-02-13 RX ADMIN — LABETALOL HYDROCHLORIDE 100 MG: 200 TABLET, FILM COATED ORAL at 13:09

## 2025-02-13 RX ADMIN — HEPARIN SODIUM 5000 UNITS: 5000 INJECTION, SOLUTION INTRAVENOUS; SUBCUTANEOUS at 05:32

## 2025-02-13 RX ADMIN — HEPARIN SODIUM 5000 UNITS: 5000 INJECTION, SOLUTION INTRAVENOUS; SUBCUTANEOUS at 14:34

## 2025-02-13 RX ADMIN — LABETALOL HYDROCHLORIDE 200 MG: 200 TABLET, FILM COATED ORAL at 21:11

## 2025-02-13 RX ADMIN — PROCHLORPERAZINE EDISYLATE 10 MG: 5 INJECTION INTRAMUSCULAR; INTRAVENOUS at 02:19

## 2025-02-13 RX ADMIN — HYDRALAZINE HYDROCHLORIDE 10 MG: 20 INJECTION INTRAMUSCULAR; INTRAVENOUS at 18:14

## 2025-02-13 RX ADMIN — LABETALOL HYDROCHLORIDE 100 MG: 200 TABLET, FILM COATED ORAL at 08:18

## 2025-02-13 RX ADMIN — ANTACID TABLETS 1000 MG: 500 TABLET, CHEWABLE ORAL at 03:06

## 2025-02-13 RX ADMIN — HEPARIN SODIUM 5000 UNITS: 5000 INJECTION, SOLUTION INTRAVENOUS; SUBCUTANEOUS at 21:11

## 2025-02-13 RX ADMIN — ANTACID TABLETS 1000 MG: 500 TABLET, CHEWABLE ORAL at 18:25

## 2025-02-13 ASSESSMENT — PAIN SCALES - GENERAL
PAINLEVEL_OUTOF10: 0 - NO PAIN
PAINLEVEL_OUTOF10: 0 - NO PAIN

## 2025-02-13 ASSESSMENT — PAIN - FUNCTIONAL ASSESSMENT
PAIN_FUNCTIONAL_ASSESSMENT: 0-10
PAIN_FUNCTIONAL_ASSESSMENT: 0-10

## 2025-02-13 NOTE — CARE PLAN
The patient's goals for the shift include rest    The clinical goals for the shift include patient will be discharged today

## 2025-02-13 NOTE — PROGRESS NOTES
Vipul Mike is a 34 y.o. female on day 2 of admission presenting with Hypertensive emergency.      Subjective   Patient seen and examined at bedside.  She has been weaned off of Cardene drip.    Case was discussed with nephrologist, Dr. Berrios.  He recommends increasing labetalol to 200 mg twice daily and to monitor overnight and potentially discharge tomorrow if blood pressure remains stable off of Cardene drip         Objective     Last Recorded Vitals  BP (!) 180/100 (BP Location: Left arm, Patient Position: Lying)   Pulse 82   Temp 36.1 °C (97 °F)   Resp 18   Wt 104 kg (230 lb 2.6 oz)   SpO2 98%   Intake/Output last 3 Shifts:    Intake/Output Summary (Last 24 hours) at 2/13/2025 1255  Last data filed at 2/13/2025 0842  Gross per 24 hour   Intake 1150 ml   Output 1 ml   Net 1149 ml       Admission Weight  Weight: 109 kg (240 lb) (02/11/25 0818)    Daily Weight  02/11/25 : 104 kg (230 lb 2.6 oz)    Image Results  Vascular US renal [place] [address]  Phone [phone], Fax [phone]       Vascular Lab Report     VASC US RENAL ARTERY DUPLEX COMPLETE    Patient Name:      VIPUL MIKE     Reading Physician:  [id number] Teto Villa DO  Study Date:        2/13/2025            Ordering Provider:  [id number] TESFAYE BERRIOS  MRN/PID:           [id number]             Fellow:  Accession#:        [id number]         Technologist:       Carlos Enrique Juares RVT  Date of Birth/Age: 1990 / 34 years Technologist 2:  Gender:            F                    Encounter#:         [id number]  Admission Status:  Inpatient            Location Performed: [facility]       Diagnosis/ICD: Essential primary hypertension-I10  CPT Codes:     72295 Abdominal Visceral Renal       Pertinent History: HTN.       CONCLUSIONS:  Renal Artery Duplex: Bilateral renal arteries demonstrate no evidence of hemodynamically significant stenosis. The  bilateral renal veins are widely patent.     Imaging & Doppler Findings:     Renal Artery Duplex Right Kidney: 10.4 cm                           Left Kidney: 11.4 cm        Systolic        Diastolic     ARTERY            Systolic       Diastolic        129 cm/s         39 cm/s      Origin            138 cm/s        37 cm/s        138 cm/s         59 cm/s       Prox             114 cm/s        40 cm/s         80 cm/s         35 cm/s        Mid             184 cm/s        61 cm/s         50 cm/s         25 cm/s      Distal            147 cm/s        51 cm/s         30 cm/s         14 cm/s     Superior           31 cm/s         15 cm/s         24 cm/s         11 cm/s     Inferior           20 cm/s         8 cm/s                          Right                           Left                           1.1       R/A Ratio            1.4                           0.5    Resistive Index         0.5       Ao Dist Diam 1.59 cm  Mid Ao       127 cm/s          [id number] Teto Villa DO  Electronically signed by [id number] Teto Villa DO on 2/13/2025 at 12:39:20 PM       ** Final **        Physical exam:  General:     Well appearing  HENT:      Head: Normocephalic and atraumatic.      Mouth: Mucous membranes are moist.   Eyes:      Pupils are equal, round, and reactive to light.   Cardiovascular:      Normal rate and regular rhythm. Normal S1, S2.  No murmurs, clicks, gallops.   Pulmonary:      Clear to auscultation bilaterally.  No wheezing, rhonchi, or crackles heard.   Abdominal:       Bowel sounds are normal.      Abdomen is obese, soft, nontender, nondistended  Skin:      No lesions seen  Musculoskeletal:         Extremities: No edema present. No deformities with no abnormal range of motion     Neck supple.   Neurological:      Mental Status: Patient is alert and oriented X3     CN II-XII intact.  No focal neurologic deficits appreciated.     Relevant Results               Assessment/Plan                  Assessment &  Plan  Hypertensive emergency  Admitted to the ICU.  Patient has been weaned off of Cardene drip.  Increase labetalol to 200 mg twice daily.  Nephrology is consulted.   Secondary hypertension workup has been initiated.  This will need to be followed up outpatient.  Intractable nausea and vomiting  Possibly initially related to the patient taking GLP-1 medication.  Continued symptoms may also be related to her hypertensive emergency.  She also had associated headache with this.  Nausea and vomiting has improved after control of blood pressure . Continue antiemetics as needed.  3.  Hypokalemia: Give p.o. potassium chloride 40 mEq at this time.  May be related to GI losses.  Follow-up BMP and magnesium in the morning.  Consider scheduled potassium if this is persistently low.    4.  Leukocytosis: White blood cell count still elevated.  Still no sign of infection at this time.  Likely reactive.  Monitor off antibiotics.    5.  Active marijuana usage    6.  Obese: BMI 36.49              DVT ppx: Heparin  Diet: Regular    Disposition: Continue making blood pressure adjustments.  Continue antiemetics.  Anticipate patient remain hospitalized for additional 24 hours.  Discharge disposition is to return home once medically stable.    Luis Carlos Garg DO

## 2025-02-13 NOTE — PROGRESS NOTES
Vipul Kennedy is a 34 y.o. female on day 2 of admission presenting with Hypertensive emergency.      Subjective   Patient seen and examined at the bedside  She is resting comfortably in bed at this time  She is feeling much improved from yesterday         Objective          Vitals 24HR  Heart Rate:  []   Temp:  [36.3 °C (97.3 °F)-36.5 °C (97.7 °F)]   Resp:  [12-23]   BP: (126-174)/()   SpO2:  [97 %-100 %]         Intake/Output last 3 Shifts:    Intake/Output Summary (Last 24 hours) at 2/13/2025 0954  Last data filed at 2/12/2025 1600  Gross per 24 hour   Intake 955.62 ml   Output 502 ml   Net 453.62 ml       Physical Exam  Constitutional:       Appearance: Normal appearance. She is obese.   HENT:      Head: Normocephalic and atraumatic.      Right Ear: External ear normal.      Left Ear: External ear normal.      Nose: Nose normal.      Mouth/Throat:      Mouth: Mucous membranes are moist.      Pharynx: Oropharynx is clear.   Eyes:      Extraocular Movements: Extraocular movements intact.      Conjunctiva/sclera: Conjunctivae normal.      Pupils: Pupils are equal, round, and reactive to light.   Cardiovascular:      Rate and Rhythm: Normal rate and regular rhythm.   Pulmonary:      Effort: Pulmonary effort is normal.      Breath sounds: Normal breath sounds.   Abdominal:      General: Abdomen is flat.      Palpations: Abdomen is soft.   Skin:     General: Skin is warm and dry.   Neurological:      General: No focal deficit present.      Mental Status: She is alert and oriented to person, place, and time.   Psychiatric:         Mood and Affect: Mood normal.         Behavior: Behavior normal.         Relevant Results               Assessment/Plan              Assessment & Plan  Hypertensive emergency    Intractable nausea and vomiting    Uncontrolled hypertension without evidence of endorgan damage at this time  Hypokalemia  Normal anion gap metabolic acidosis: Resolved  Nausea and vomiting:  Resolved    Plan:  Her blood pressure has come down nicely at this time.  She also has a heart rate that has now normalized  She is feeling much better at this time  We will increase labetalol to 200 mg twice a day  Workup for secondary reasons for her blood pressure has been ordered  Renal artery Doppler is negative for stenosis  Hopefully she can be discharged tomorrow  The rest of her workup will take a few days  I can then follow with her as an outpatient based on how she does over the next 24 hours                Son Berrios, DO

## 2025-02-13 NOTE — CARE PLAN
The patient's goals for the shift include rest    The clinical goals for the shift include no nauseousness or vomiting able to tolerate food and blood pressure remians stable off cardene drip    Over the shift, the patient did make progress toward the following goals. Barriers to progression include . Recommendations to address these barriers include .    Problem: Pain - Adult  Goal: Verbalizes/displays adequate comfort level or baseline comfort level  Outcome: Progressing  Flowsheets (Taken 2/13/2025 0548)  Verbalizes/displays adequate comfort level or baseline comfort level:   Encourage patient to monitor pain and request assistance   Assess pain using appropriate pain scale     Problem: Safety - Adult  Goal: Free from fall injury  Outcome: Progressing     Problem: Chronic Conditions and Co-morbidities  Goal: Patient's chronic conditions and co-morbidity symptoms are monitored and maintained or improved  Outcome: Progressing  Flowsheets (Taken 2/13/2025 0548)  Care Plan - Patient's Chronic Conditions and Co-Morbidity Symptoms are Monitored and Maintained or Improved: Monitor and assess patient's chronic conditions and comorbid symptoms for stability, deterioration, or improvement     Problem: Nutrition  Goal: Nutrient intake appropriate for maintaining nutritional needs  Outcome: Progressing

## 2025-02-13 NOTE — ASSESSMENT & PLAN NOTE
Admitted to the ICU.  Patient has been weaned off of Cardene drip.  Increase labetalol to 200 mg twice daily.  Nephrology is consulted.   Secondary hypertension workup has been initiated.  This will need to be followed up outpatient.

## 2025-02-13 NOTE — ASSESSMENT & PLAN NOTE
Possibly initially related to the patient taking GLP-1 medication.  Continued symptoms may also be related to her hypertensive emergency.  She also had associated headache with this.  Nausea and vomiting has improved after control of blood pressure . Continue antiemetics as needed.

## 2025-02-13 NOTE — PROGRESS NOTES
Care Transitions: Patient reviewed in care round meeting this AM and is not medically ready for discharge today. ADOD 24 hours. Discharge plan is return home with her family, denies needs. Care team to follow. Lena Gomez RN/TCC

## 2025-02-14 VITALS
RESPIRATION RATE: 15 BRPM | WEIGHT: 227.51 LBS | HEART RATE: 79 BPM | HEIGHT: 68 IN | DIASTOLIC BLOOD PRESSURE: 113 MMHG | OXYGEN SATURATION: 100 % | SYSTOLIC BLOOD PRESSURE: 178 MMHG | BODY MASS INDEX: 34.48 KG/M2 | TEMPERATURE: 97.2 F

## 2025-02-14 LAB
ALDOSTERONE IN SERUM: 6.5 NG/DL
ANION GAP SERPL CALC-SCNC: 12 MMOL/L (ref 10–20)
BUN SERPL-MCNC: 17 MG/DL (ref 6–23)
CALCIUM SERPL-MCNC: 9.7 MG/DL (ref 8.6–10.3)
CHLORIDE SERPL-SCNC: 100 MMOL/L (ref 98–107)
CO2 SERPL-SCNC: 24 MMOL/L (ref 21–32)
CREAT SERPL-MCNC: 0.83 MG/DL (ref 0.5–1.05)
EGFRCR SERPLBLD CKD-EPI 2021: >90 ML/MIN/1.73M*2
GLUCOSE SERPL-MCNC: 85 MG/DL (ref 74–99)
HOLD SPECIMEN: NORMAL
POTASSIUM SERPL-SCNC: 3.4 MMOL/L (ref 3.5–5.3)
SODIUM SERPL-SCNC: 133 MMOL/L (ref 136–145)

## 2025-02-14 PROCEDURE — 2500000004 HC RX 250 GENERAL PHARMACY W/ HCPCS (ALT 636 FOR OP/ED): Performed by: STUDENT IN AN ORGANIZED HEALTH CARE EDUCATION/TRAINING PROGRAM

## 2025-02-14 PROCEDURE — 99239 HOSP IP/OBS DSCHRG MGMT >30: CPT | Performed by: STUDENT IN AN ORGANIZED HEALTH CARE EDUCATION/TRAINING PROGRAM

## 2025-02-14 PROCEDURE — 2500000005 HC RX 250 GENERAL PHARMACY W/O HCPCS

## 2025-02-14 PROCEDURE — 99233 SBSQ HOSP IP/OBS HIGH 50: CPT | Performed by: INTERNAL MEDICINE

## 2025-02-14 PROCEDURE — 36415 COLL VENOUS BLD VENIPUNCTURE: CPT | Performed by: INTERNAL MEDICINE

## 2025-02-14 PROCEDURE — 80048 BASIC METABOLIC PNL TOTAL CA: CPT | Performed by: INTERNAL MEDICINE

## 2025-02-14 PROCEDURE — 2500000001 HC RX 250 WO HCPCS SELF ADMINISTERED DRUGS (ALT 637 FOR MEDICARE OP): Performed by: STUDENT IN AN ORGANIZED HEALTH CARE EDUCATION/TRAINING PROGRAM

## 2025-02-14 RX ORDER — SODIUM CHLORIDE 9 MG/ML
INJECTION, SOLUTION INTRAMUSCULAR; INTRAVENOUS; SUBCUTANEOUS
Status: COMPLETED
Start: 2025-02-14 | End: 2025-02-14

## 2025-02-14 RX ORDER — LABETALOL 200 MG/1
200 TABLET, FILM COATED ORAL 2 TIMES DAILY
Qty: 60 TABLET | Refills: 0 | Status: SHIPPED | OUTPATIENT
Start: 2025-02-14 | End: 2025-03-16

## 2025-02-14 RX ADMIN — PANTOPRAZOLE SODIUM 40 MG: 40 INJECTION, POWDER, FOR SOLUTION INTRAVENOUS at 08:03

## 2025-02-14 RX ADMIN — LABETALOL HYDROCHLORIDE 200 MG: 200 TABLET, FILM COATED ORAL at 08:03

## 2025-02-14 RX ADMIN — SODIUM CHLORIDE 10 ML: 9 INJECTION, SOLUTION INTRAMUSCULAR; INTRAVENOUS; SUBCUTANEOUS at 08:03

## 2025-02-14 RX ADMIN — HEPARIN SODIUM 5000 UNITS: 5000 INJECTION, SOLUTION INTRAVENOUS; SUBCUTANEOUS at 05:55

## 2025-02-14 ASSESSMENT — PAIN - FUNCTIONAL ASSESSMENT
PAIN_FUNCTIONAL_ASSESSMENT: 0-10
PAIN_FUNCTIONAL_ASSESSMENT: 0-10

## 2025-02-14 ASSESSMENT — PAIN SCALES - GENERAL
PAINLEVEL_OUTOF10: 0 - NO PAIN
PAINLEVEL_OUTOF10: 0 - NO PAIN

## 2025-02-14 ASSESSMENT — COGNITIVE AND FUNCTIONAL STATUS - GENERAL
CLIMB 3 TO 5 STEPS WITH RAILING: A LITTLE
MOBILITY SCORE: 21
WALKING IN HOSPITAL ROOM: A LITTLE
STANDING UP FROM CHAIR USING ARMS: A LITTLE

## 2025-02-14 NOTE — NURSING NOTE
Discharge Note: 2/14/2025 1303 Declines wheelchair, ambulates to private car accompanied by RN, personal belongings taken by pt, no distress noted, no complaints voiced. Val LEMOS

## 2025-02-14 NOTE — PROGRESS NOTES
Vipul Kennedy is a 34 y.o. female on day 3 of admission presenting with Hypertensive emergency.      Subjective   Patient seen and examined at the bedside  She is resting comfortably in bed  Has no major complaints       Objective          Vitals 24HR  Heart Rate:  []   Temp:  [36.2 °C (97.2 °F)-36.5 °C (97.7 °F)]   Resp:  [10-26]   BP: (145-200)/()   Weight:  [103 kg (227 lb 8.2 oz)]   SpO2:  [94 %-100 %]         Intake/Output last 3 Shifts:    Intake/Output Summary (Last 24 hours) at 2/14/2025 0947  Last data filed at 2/13/2025 1800  Gross per 24 hour   Intake 222 ml   Output 3 ml   Net 219 ml       Physical Exam  Constitutional:       Appearance: Normal appearance. She is obese.   HENT:      Head: Normocephalic and atraumatic.      Right Ear: External ear normal.      Left Ear: External ear normal.      Nose: Nose normal.      Mouth/Throat:      Mouth: Mucous membranes are moist.      Pharynx: Oropharynx is clear.   Eyes:      Extraocular Movements: Extraocular movements intact.      Conjunctiva/sclera: Conjunctivae normal.      Pupils: Pupils are equal, round, and reactive to light.   Cardiovascular:      Rate and Rhythm: Normal rate and regular rhythm.   Pulmonary:      Effort: Pulmonary effort is normal.      Breath sounds: Normal breath sounds.   Abdominal:      General: Abdomen is flat.      Palpations: Abdomen is soft.   Skin:     General: Skin is warm and dry.   Neurological:      General: No focal deficit present.      Mental Status: She is alert and oriented to person, place, and time.   Psychiatric:         Mood and Affect: Mood normal.         Behavior: Behavior normal.     Scheduled medications  heparin (porcine), 5,000 Units, subcutaneous, q8h DONNY  labetalol, 200 mg, oral, BID  pantoprazole, 40 mg, intravenous, Daily      Continuous medications     PRN medications  PRN medications: calcium carbonate, hydrALAZINE, ondansetron, polyethylene glycol, prochlorperazine,  traZODone      Relevant Results               Assessment/Plan              Assessment & Plan  Hypertensive emergency    Intractable nausea and vomiting    Uncontrolled hypertension without evidence of endorgan damage at this time  Hypokalemia    Plan:  Workup for secondary hypertension is still in process  Her blood pressure is at least stable for now she has had a few spikes but blood pressures are running in the 150s to 160s for the most part  She can be discharged to home  Continue labetalol 200 twice a day for now heart rate is good  Potassium replacement with 20 mEq daily is my recommendation  With her low potassium she may have secondary reasons we will have to see what her aldosterone and renin show us  Once we get all of that back we will further investigate  She has a blood pressure cuff at home  She is to check her blood pressure once to twice daily record it and bring it to my office  I can see her next week for follow-up                  Son Berrios, DO

## 2025-02-14 NOTE — NURSING NOTE
Pt discharged.  Discussed new medications, medications to resume, follow up appts, how to keep a blood pressure log, activity, diet, and reasons to return to the hospital

## 2025-02-14 NOTE — CARE PLAN
The patient's goals for the shift include rest    The clinical goals for the shift include sbp<160 by eos    Over the shift, the patient did not make progress toward the following goals. Barriers to progression include . Recommendations to address these barriers include .

## 2025-02-14 NOTE — CARE PLAN
The clinical goals for the shift include systolic blood pressure >160 through shift      Problem: Pain - Adult  Goal: Verbalizes/displays adequate comfort level or baseline comfort level  Outcome: Progressing     Problem: Safety - Adult  Goal: Free from fall injury  Outcome: Progressing     Problem: Nutrition  Goal: Nutrient intake appropriate for maintaining nutritional needs  Outcome: Progressing

## 2025-02-14 NOTE — DISCHARGE SUMMARY
Discharge Diagnosis  Hypertensive emergency    Issues Requiring Follow-Up  HTN    Discharge Meds     Medication List      START taking these medications     labetalol 200 mg tablet; Commonly known as: Normodyne; Take 1 tablet   (200 mg) by mouth 2 times a day.     CONTINUE taking these medications     omeprazole 20 mg DR capsule; Commonly known as: PriLOSEC; Take 1 capsule   (20 mg) by mouth 2 times a day. Do not crush or chew.     STOP taking these medications     furosemide 20 mg tablet; Commonly known as: Lasix   losartan 50 mg tablet; Commonly known as: Cozaar   Wegovy 0.25 mg/0.5 mL pen injector; Generic drug: semaglutide (weight   loss)       Test Results Pending At Discharge  Pending Labs       Order Current Status    Aldosterone In process    Catecholamines, fractionated, plasma In process    Metanephrines Plasma In process    Renin direct assay In process            Disposition:  home    Hospital Course   34 y.o. female with a past medical history of hypertension presenting with intractable nausea and vomiting.  She had recently started a new weight loss medication, Zepbound (tirzepatide), about 5 days ago.  Since, she has had continuously felt nauseous and throwing up.  She also has associated headache in the back of her head.  She last had a bowel movement 3 days ago.  She has associated chills and sweats shakes.  Her son was sick with the flu last week.  She denies fever, sweats, body aches, chest pain, dyspnea, palpitations, lightheadedness, abdominal pain, diarrhea, dysuria.  She has not taken her medications for 3 days.     Of note, patient has a longstanding history of hypertension.  She says she will run in the 200s she does not take her medications and will run in the 160s if she does take her medications.  She is on losartan and Lasix.  She only uses Lasix as needed for swelling.     Workup in the emergency department showed blood pressure significantly elevated at 250/145.  Patient was given IV  labetalol and clonidine tablet 0.3 mg as well as 2 L of fluid.  CT head showed no acute intracranial process.  CT abdomen pelvis with IV contrast is pending a radiology review but from my independent review, there was no obvious intra-abdominal process.  CBC showed white blood cell count of 13.6, hemoglobin of 15.1, platelet count of 325.  Flu testing was negative.  SARS-CoV-2 testing was negative.  BMP showed bicarb of 20, potassium 3.3, serum creatinine 0.91.  Troponin was 7.  Urinalysis was not suggestive of infection but did show ketones.  hCG was negative.  Patient will be admitted to the ICU started on Cardene drip for hypertensive emergency.    During hospital stay, patient's blood pressure would improve on cardene drip.  Nephrology would evaluate the patient and start the patient on labetalol and increased to 200mg BID.  Nausea would resolve at discharge and she would be able to tolerate a diet.  Secondary hypertension work up was initiated in the hospital.  Renal artery ultrasound showed no significant stenosis.  Remaining labs are still pending and will be followed by nephrology outpatient.  Prescription for labetalol 200 mg twice daily is provided at discharge she will follow nephrology outpatient in 2 weeks.  Follow up with PCP in one week.  Vital signs were stable at discharge.  Patient agreed to discharge plan.    35 minutes total was spent on discharge planning including chart review, medication ordering, and discussion with family and hospital staff.       Pertinent Physical Exam At Time of Discharge  General:     Well appearing  HENT:      Head: Normocephalic and atraumatic.      Mouth: Mucous membranes are moist.   Eyes:      Pupils are equal, round, and reactive to light.   Cardiovascular:      Normal rate and regular rhythm. Normal S1, S2.  No murmurs, clicks, gallops.   Pulmonary:      Clear to auscultation bilaterally.  No wheezing, rhonchi, or crackles heard.   Abdominal:       Bowel sounds  are normal.      Abdomen is obese, soft, nontender, nondistended  Skin:      No lesions seen  Musculoskeletal:         Extremities: No edema present. No deformities with no abnormal range of motion     Neck supple.   Neurological:      Mental Status: Patient is alert and oriented X3     CN II-XII intact.  No focal neurologic deficits appreciated.     Outpatient Follow-Up  No future appointments.      Luis Carlos Garg DO

## 2025-02-14 NOTE — DISCHARGE INSTRUCTIONS
Continue monitoring your blood pressure once a day in the morning.  Take your blood pressure sitting with your arm at heart level (sitting on a table).  Keep a journal of your daily blood pressures

## 2025-02-15 LAB — RENIN PLAS-CCNC: 12.3 NG/ML/HR

## 2025-02-16 LAB
ANNOTATION COMMENT IMP: ABNORMAL
METANEPHS SERPL-SCNC: 0.23 NMOL/L (ref 0–0.49)
NORMETANEPH FREE SERPL-SCNC: 1.09 NMOL/L (ref 0–0.89)

## 2025-02-17 ENCOUNTER — PATIENT OUTREACH (OUTPATIENT)
Dept: PRIMARY CARE | Facility: CLINIC | Age: 35
End: 2025-02-17
Payer: MEDICAID

## 2025-02-18 ENCOUNTER — PATIENT OUTREACH (OUTPATIENT)
Dept: PRIMARY CARE | Facility: CLINIC | Age: 35
End: 2025-02-18
Payer: MEDICAID

## 2025-02-18 NOTE — PROGRESS NOTES
Discharge Facility:[facility]  Discharge Diagnosis:Hypertensive Emergency  Admission Date:2/11/2025  Discharge Date: 2/14/2025    PCP Appointment Date:TBD  Specialist Appointment Date: TBD  Hospital Encounter and Summary Linked: Yes  ED to Hosp-Admission (Discharged) with Luis Carlos Garg DO; Samuel Ferris DO (02/11/2025)   See discharge assessment below for further details  Wrap Up  Wrap Up Additional Comments: -- (Vipul is doing very well, she is monitoring her BP which she states is in good range. She will contact provider if any concerns.) (2/18/2025  8:42 AM)    Engagement  Call Start Time: 1747 (2/18/2025  8:42 AM)    Medications  Medications reviewed with patient/caregiver?: Yes (2/18/2025  8:42 AM)  Is the patient having any side effects they believe may be caused by any medication additions or changes?: No (2/18/2025  8:42 AM)  Does the patient have all medications ordered at discharge?: Yes (2/18/2025  8:42 AM)  Care Management Interventions: No intervention needed (2/18/2025  8:42 AM)  Prescription Comments: -- (New: Labetalol 200mg bid  D/C Furosemide, Losartan, Wegovy) (2/18/2025  8:42 AM)  Is the patient taking all medications as directed (includes completed medication regime)?: Yes (2/18/2025  8:42 AM)  Medication Comments: -- (Vipul verbalized understanding of medications) (2/18/2025  8:42 AM)    Appointments  Does the patient have a primary care provider?: Yes (2/18/2025  8:42 AM)  Care Management Interventions: -- (She will call to schedule,mentioned provider moving to different location) (2/18/2025  8:42 AM)  Has the patient kept scheduled appointments due by today?: Yes (2/18/2025  8:42 AM)    Self Management  What is the home health agency?: -- (NA) (2/18/2025  8:42 AM)  What Durable Medical Equipment (DME) was ordered?: -- (NA) (2/18/2025  8:42 AM)    Patient Teaching  Does the patient have access to their discharge instructions?: Yes (2/18/2025  8:42 AM)  What is the patient's perception  of their health status since discharge?: Improving (2/18/2025  8:42 AM)  Is the patient/caregiver able to teach back the hierarchy of who to call/visit for symptoms/problems? PCP, Specialist, Home Health nurse, Urgent Care, ED, 911: Yes (2/18/2025  8:42 AM)

## 2025-02-25 LAB
DOPAMINE SERPL-MCNC: 40 PG/ML (ref 0–48)
EPINEPH PLAS-MCNC: 100 PG/ML (ref 0–62)
NOREPINEPH PLAS-MCNC: 1069 PG/ML (ref 0–874)

## 2025-03-05 ENCOUNTER — PATIENT OUTREACH (OUTPATIENT)
Dept: PRIMARY CARE | Facility: CLINIC | Age: 35
End: 2025-03-05
Payer: MEDICAID

## 2025-03-14 ENCOUNTER — APPOINTMENT (OUTPATIENT)
Dept: PRIMARY CARE | Facility: CLINIC | Age: 35
End: 2025-03-14
Payer: MEDICAID

## 2025-03-14 VITALS
HEART RATE: 78 BPM | WEIGHT: 232 LBS | HEIGHT: 68 IN | SYSTOLIC BLOOD PRESSURE: 168 MMHG | BODY MASS INDEX: 35.16 KG/M2 | DIASTOLIC BLOOD PRESSURE: 124 MMHG

## 2025-03-14 DIAGNOSIS — K42.0 UMBILICAL HERNIA WITH OBSTRUCTION, WITHOUT GANGRENE: ICD-10-CM

## 2025-03-14 DIAGNOSIS — I10 PRIMARY HYPERTENSION: Primary | ICD-10-CM

## 2025-03-14 PROCEDURE — 3008F BODY MASS INDEX DOCD: CPT

## 2025-03-14 PROCEDURE — 3080F DIAST BP >= 90 MM HG: CPT

## 2025-03-14 PROCEDURE — 1036F TOBACCO NON-USER: CPT

## 2025-03-14 PROCEDURE — 3077F SYST BP >= 140 MM HG: CPT

## 2025-03-14 PROCEDURE — 99213 OFFICE O/P EST LOW 20 MIN: CPT

## 2025-03-14 RX ORDER — VALSARTAN 80 MG/1
80 TABLET ORAL DAILY
Qty: 100 TABLET | Refills: 3 | Status: SHIPPED | OUTPATIENT
Start: 2025-03-14 | End: 2026-04-18

## 2025-03-14 RX ORDER — HYDROCHLOROTHIAZIDE 25 MG/1
25 TABLET ORAL DAILY
Qty: 30 TABLET | Refills: 0 | Status: SHIPPED | OUTPATIENT
Start: 2025-03-14 | End: 2025-04-13

## 2025-03-14 ASSESSMENT — ENCOUNTER SYMPTOMS
ABDOMINAL PAIN: 1
NAUSEA: 0
VOMITING: 0
CONSTIPATION: 0
HEADACHES: 0
FEVER: 0
PALPITATIONS: 0
SHORTNESS OF BREATH: 0
LIGHT-HEADEDNESS: 0
DIARRHEA: 0
COUGH: 0
DYSURIA: 0
FATIGUE: 0
CHILLS: 0

## 2025-03-14 NOTE — PROGRESS NOTES
"Subjective   Patient ID: Vipul Kennedy is a 34 y.o. female who presents for Hospital Follow-up.    HPI   Here today for hospital follow up   She went to the ER for nausea and vomiting, found to be in a hypertensive crisis. She was started on labetalol and continued it outpatient. Her BP is still elevated on the labetalol. Denies chest pain or headaches, no blurry vision or urinary concerns. Reports she had an incident at work on Monday and had an intense pain ion her abdomen, it is hard on palpation as well.   Review of Systems   Constitutional:  Negative for chills, fatigue and fever.   Respiratory:  Negative for cough and shortness of breath.    Cardiovascular:  Negative for chest pain, palpitations and leg swelling.   Gastrointestinal:  Positive for abdominal pain. Negative for constipation, diarrhea, nausea and vomiting.   Genitourinary:  Negative for dysuria.   Neurological:  Negative for light-headedness and headaches.       Objective   BP (!) 168/124 (Patient Position: Sitting)   Pulse 78   Ht 1.727 m (5' 8\")   Wt 105 kg (232 lb)   BMI 35.28 kg/m²     Physical Exam  Cardiovascular:      Rate and Rhythm: Normal rate and regular rhythm.      Heart sounds: Normal heart sounds.   Pulmonary:      Breath sounds: Normal breath sounds.   Abdominal:      General: There is no distension.      Tenderness: There is abdominal tenderness.      Hernia: A hernia is present.   Neurological:      Mental Status: She is alert and oriented to person, place, and time.         Assessment/Plan   Problem List Items Addressed This Visit    None  Visit Diagnoses         Codes    Primary hypertension    -  Primary I10    Relevant Medications    valsartan (Diovan) 80 mg tablet    hydroCHLOROthiazide (HYDRODiuril) 25 mg tablet    Umbilical hernia with obstruction, without gangrene     K42.0    Relevant Orders    US abdomen complete    Referral to General Surgery             Htn  -Valsartan 80 mg   -hydrochlorothiazide 25 mg   -stop " the labetalol    Umbilical hernia  -US abdomen  -General surgery referral

## 2025-03-18 ENCOUNTER — HOSPITAL ENCOUNTER (OUTPATIENT)
Dept: RADIOLOGY | Facility: HOSPITAL | Age: 35
Discharge: HOME | End: 2025-03-18
Payer: MEDICAID

## 2025-03-18 DIAGNOSIS — K42.0 UMBILICAL HERNIA WITH OBSTRUCTION, WITHOUT GANGRENE: ICD-10-CM

## 2025-03-18 PROCEDURE — 76705 ECHO EXAM OF ABDOMEN: CPT

## 2025-03-18 PROCEDURE — 76705 ECHO EXAM OF ABDOMEN: CPT | Performed by: RADIOLOGY

## 2025-03-27 ENCOUNTER — APPOINTMENT (OUTPATIENT)
Dept: SURGERY | Facility: CLINIC | Age: 35
End: 2025-03-27
Payer: MEDICAID

## 2025-03-27 VITALS
SYSTOLIC BLOOD PRESSURE: 166 MMHG | DIASTOLIC BLOOD PRESSURE: 96 MMHG | HEIGHT: 68 IN | WEIGHT: 230.6 LBS | HEART RATE: 63 BPM | BODY MASS INDEX: 34.95 KG/M2

## 2025-03-27 DIAGNOSIS — K42.0 UMBILICAL HERNIA WITH OBSTRUCTION, WITHOUT GANGRENE: ICD-10-CM

## 2025-03-27 PROCEDURE — 3008F BODY MASS INDEX DOCD: CPT | Performed by: SURGERY

## 2025-03-27 PROCEDURE — 3080F DIAST BP >= 90 MM HG: CPT | Performed by: SURGERY

## 2025-03-27 PROCEDURE — 1036F TOBACCO NON-USER: CPT | Performed by: SURGERY

## 2025-03-27 PROCEDURE — 3077F SYST BP >= 140 MM HG: CPT | Performed by: SURGERY

## 2025-03-27 PROCEDURE — 99243 OFF/OP CNSLTJ NEW/EST LOW 30: CPT | Performed by: SURGERY

## 2025-03-27 NOTE — PROGRESS NOTES
"General Surgery Consultation    Patient: Vipul Kennedy  : 1990  MRN: [id number]  Date of Consultation: 25    Referring Primary Care Provider: MINE Cabral    Chief Complaint: Periumbilical pain    History of Present Illness: Vipul Kennedy is a 34 y.o. old female seen at the request of MINE Tinajero, for evaluation of am umbilical hernia.  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  She lifted a heavy bucket of frosting and had immediate pain at the umbilicus.  The pain lasted for about 5 to 10 days.  It was worse if she used her abdominal muscles.  She did not see a visible bulge but when she felt around the bellybutton she feels \"something's there.\"  The pain has since resolved.  She has a little bit of residual discomfort if she bends over a certain way or when she touches the umbilicus.  She has no previous surgery at the umbilicus.  She had a  and tubal ligation, but tubal ligation was performed through the Pfannenstiel incision at time of .  She had a CT scan that showed the presence of a very tiny umbilical hernia, with about 3 cm of fat protruding through the subcentimeter defect.    Medical History:  Umbilical hernia  BMI 35  HTN  GERD  Sciatica    Surgical History:    Bilateral tubal ligation    Home Medications:  Prior to Admission medications    Medication Sig Start Date End Date Taking? Authorizing Provider   hydroCHLOROthiazide (HYDRODiuril) 25 mg tablet Take 1 tablet (25 mg) by mouth once daily. 3/14/25 4/13/25  MINE Cabral   omeprazole (PriLOSEC) 20 mg DR capsule Take 1 capsule (20 mg) by mouth 2 times a day. Do not crush or chew. 25  MINE Cabral   valsartan (Diovan) 80 mg tablet Take 1 tablet (80 mg) by mouth once daily. 3/14/25 4/18/26  MINE Cabral     Allergies:  No Known Allergies    Family History:   Mother with hypertension, kidney disease, anxiety disorder.  Father " "with mental illness/anxiety disorder.     Social History:  Non-smoker.  Rare alcohol use.  Marijuana use.    ROS:  Constitutional:  no fever, sweats, and chills  Cardiovascular: No chest pain  Respiratory: No cough or shortness of breath  Gastrointestinal: + Periumbilical pain (now much improved, just a little discomfort if bends over or touches the umbilicus), no obstructive symptoms, no visible bulge  Genitourinary: no dysuria  Musculoskeletal: no weakness or swelling  Integumentary: no rashes  Neurological: no confusion  Endocrine: no heat or cold intolerance  Heme/Lymph: no easy bruising or bleeding    Objective:  BP (!) 166/96   Pulse 63   Ht 1.727 m (5' 8\")   Wt 105 kg (230 lb 9.6 oz)   BMI 35.06 kg/m²     Physical Exam:  Constitutional: No acute distress, conversant, pleasant  Neurologic: alert and oriented  Psych: appropriate affect  Ears, Nose, Mouth and Throat: mucus membranes moist  Pulmonary: No labored breathing  Cardiovascular: Regular rate and rhythm  Abdomen: soft, non-distended, BMI 35, she has a small nodule palpable at the base of the umbilicus that I suspect is a small piece of incarcerated fat through a very tiny hernia defect.  This does not reduce.  She is minimally tender focally at this site on attempted reduction.  Otherwise, her abdomen is nontender on exam.  Musculoskeletal: Moves all extremities, no edema  Skin: no jaundice    Labs:  Labs from 2/12/25 reviewed: Hgb 14, Plts 290    Imaging:  Abdominal ultrasound from 3/18/25 reviewed: Shadowing from the umbilicus limits evaluation, but within the limitation of the study there was no anterior abdominal wall defect seen.  CT abdomen and pelvis from 2/11/25 reviewed: Small fat-containing umbilical hernia.  She has 5 cm of subcutaneous fat in this area.  It looks as though she has some rectus diastases by about 3 cm, but the actual fascial defect is very tiny, less than 1cm.    Assessment and Plan: Vipul Kennedy is a 34 y.o. old female " with a subcentimeter umbilical hernia containing incarcerated fat.  We discussed the option of surgical repair.  This would be an open repair.  The defect is small enough that it would not require mesh.  Being that her pain is resolved, she would prefer to avoid surgery at this time.  We discussed that if it were to give her recurrent symptoms in the future I would be happy to reevaluate.    Christina Rubio MD  3/27/2025

## 2025-03-27 NOTE — LETTER
"2025     MINE Cabral  [address] [facility] [address]    Patient: Vipul Kennedy   YOB: 1990   Date of Visit: 3/27/2025       Dear MINE Mccarty:    Thank you for referring Vipul Kennedy to me for evaluation. Below are my notes for this consultation.  If you have questions, please do not hesitate to call me. I look forward to following your patient along with you.       Sincerely,     Christina Rubio MD      CC: No Recipients  ______________________________________________________________________________________    General Surgery Consultation    Patient: Vipul Kennedy  : 1990  MRN: [id number]  Date of Consultation: 25    Referring Primary Care Provider: MINE Cabral    Chief Complaint: Periumbilical pain    History of Present Illness: Vipul Kennedy is a 34 y.o. old female seen at the request of MINE Tinajero, for evaluation of am umbilical hernia.  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  She lifted a heavy bucket of frosting and had immediate pain at the umbilicus.  The pain lasted for about 5 to 10 days.  It was worse if she used her abdominal muscles.  She did not see a visible bulge but when she felt around the bellybutton she feels \"something's there.\"  The pain has since resolved.  She has a little bit of residual discomfort if she bends over a certain way or when she touches the umbilicus.  She has no previous surgery at the umbilicus.  She had a  and tubal ligation, but tubal ligation was performed through the Pfannenstiel incision at time of .  She had a CT scan that showed the presence of a very tiny umbilical hernia, with about 3 cm of fat protruding through the subcentimeter defect.    Medical History:  Umbilical hernia  BMI 35  HTN  GERD  Sciatica    Surgical History:    Bilateral tubal ligation    Home Medications:  Prior to Admission " "medications    Medication Sig Start Date End Date Taking? Authorizing Provider   hydroCHLOROthiazide (HYDRODiuril) 25 mg tablet Take 1 tablet (25 mg) by mouth once daily. 3/14/25 4/13/25  MINE Cabral   omeprazole (PriLOSEC) 20 mg DR capsule Take 1 capsule (20 mg) by mouth 2 times a day. Do not crush or chew. 1/7/25 2/11/25  MINE Cabral   valsartan (Diovan) 80 mg tablet Take 1 tablet (80 mg) by mouth once daily. 3/14/25 4/18/26  MINE Cabral     Allergies:  No Known Allergies    Family History:   Mother with hypertension, kidney disease, anxiety disorder.  Father with mental illness/anxiety disorder.     Social History:  Non-smoker.  Rare alcohol use.  Marijuana use.    ROS:  Constitutional:  no fever, sweats, and chills  Cardiovascular: No chest pain  Respiratory: No cough or shortness of breath  Gastrointestinal: + Periumbilical pain (now much improved, just a little discomfort if bends over or touches the umbilicus), no obstructive symptoms, no visible bulge  Genitourinary: no dysuria  Musculoskeletal: no weakness or swelling  Integumentary: no rashes  Neurological: no confusion  Endocrine: no heat or cold intolerance  Heme/Lymph: no easy bruising or bleeding    Objective:  BP (!) 166/96   Pulse 63   Ht 1.727 m (5' 8\")   Wt 105 kg (230 lb 9.6 oz)   BMI 35.06 kg/m²     Physical Exam:  Constitutional: No acute distress, conversant, pleasant  Neurologic: alert and oriented  Psych: appropriate affect  Ears, Nose, Mouth and Throat: mucus membranes moist  Pulmonary: No labored breathing  Cardiovascular: Regular rate and rhythm  Abdomen: soft, non-distended, BMI 35, she has a small nodule palpable at the base of the umbilicus that I suspect is a small piece of incarcerated fat through a very tiny hernia defect.  This does not reduce.  She is minimally tender focally at this site on attempted reduction.  Otherwise, her abdomen is nontender on exam.  Musculoskeletal: Moves all " extremities, no edema  Skin: no jaundice    Labs:  Labs from 2/12/25 reviewed: Hgb 14, Plts 290    Imaging:  Abdominal ultrasound from 3/18/25 reviewed: Shadowing from the umbilicus limits evaluation, but within the limitation of the study there was no anterior abdominal wall defect seen.  CT abdomen and pelvis from 2/11/25 reviewed: Small fat-containing umbilical hernia.  She has 5 cm of subcutaneous fat in this area.  It looks as though she has some rectus diastases by about 3 cm, but the actual fascial defect is very tiny, less than 1cm.    Assessment and Plan: Vipul Kennedy is a 34 y.o. old female with a subcentimeter umbilical hernia containing incarcerated fat.  We discussed the option of surgical repair.  This would be an open repair.  The defect is small enough that it would not require mesh.  Being that her pain is resolved, she would prefer to avoid surgery at this time.  We discussed that if it were to give her recurrent symptoms in the future I would be happy to reevaluate.    Christina Rubio MD  3/27/2025

## 2025-04-02 ENCOUNTER — PATIENT OUTREACH (OUTPATIENT)
Dept: PRIMARY CARE | Facility: CLINIC | Age: 35
End: 2025-04-02
Payer: MEDICAID

## 2025-04-08 ENCOUNTER — APPOINTMENT (OUTPATIENT)
Dept: PRIMARY CARE | Facility: CLINIC | Age: 35
End: 2025-04-08
Payer: MEDICAID

## 2025-04-10 ENCOUNTER — APPOINTMENT (OUTPATIENT)
Dept: PRIMARY CARE | Facility: CLINIC | Age: 35
End: 2025-04-10
Payer: MEDICAID

## 2025-04-18 ENCOUNTER — APPOINTMENT (OUTPATIENT)
Dept: PRIMARY CARE | Facility: CLINIC | Age: 35
End: 2025-04-18
Payer: MEDICAID

## 2025-04-18 VITALS
DIASTOLIC BLOOD PRESSURE: 87 MMHG | HEART RATE: 73 BPM | BODY MASS INDEX: 33.57 KG/M2 | WEIGHT: 221.5 LBS | HEIGHT: 68 IN | SYSTOLIC BLOOD PRESSURE: 136 MMHG

## 2025-04-18 DIAGNOSIS — I10 PRIMARY HYPERTENSION: ICD-10-CM

## 2025-04-18 DIAGNOSIS — K21.9 GASTROESOPHAGEAL REFLUX DISEASE WITHOUT ESOPHAGITIS: ICD-10-CM

## 2025-04-18 PROCEDURE — 3079F DIAST BP 80-89 MM HG: CPT

## 2025-04-18 PROCEDURE — 3075F SYST BP GE 130 - 139MM HG: CPT

## 2025-04-18 PROCEDURE — 1036F TOBACCO NON-USER: CPT

## 2025-04-18 PROCEDURE — 99213 OFFICE O/P EST LOW 20 MIN: CPT

## 2025-04-18 PROCEDURE — 3008F BODY MASS INDEX DOCD: CPT

## 2025-04-18 RX ORDER — OMEPRAZOLE 20 MG/1
20 CAPSULE, DELAYED RELEASE ORAL 2 TIMES DAILY
Qty: 60 CAPSULE | Refills: 0 | Status: SHIPPED | OUTPATIENT
Start: 2025-04-18 | End: 2025-05-18

## 2025-04-18 RX ORDER — HYDROCHLOROTHIAZIDE 25 MG/1
25 TABLET ORAL DAILY
Qty: 30 TABLET | Refills: 0 | Status: SHIPPED | OUTPATIENT
Start: 2025-04-18 | End: 2025-05-18

## 2025-04-18 ASSESSMENT — ENCOUNTER SYMPTOMS
NAUSEA: 0
DIARRHEA: 0
NUMBNESS: 0
NERVOUS/ANXIOUS: 0
VOMITING: 0
FEVER: 0
HEADACHES: 0
LIGHT-HEADEDNESS: 0
WEAKNESS: 0
CHILLS: 0
FATIGUE: 0

## 2025-04-18 NOTE — PROGRESS NOTES
"Subjective   Patient ID: Vipul Kennedy is a 34 y.o. female who presents for Follow-up (4-6 week ).    HPI   Here today for 4 week follow up   Blood pressure is managed with the medications, no side effects. She is checking her BP regularly at home   She saw Dr. Rubio for hernia repair, when she is wanting to repair it she will call back.  Omeprazole is helping her acid reflux symptoms   Review of Systems   Constitutional:  Negative for chills, fatigue and fever.   Gastrointestinal:  Negative for diarrhea, nausea and vomiting.   Neurological:  Negative for weakness, light-headedness, numbness and headaches.   Psychiatric/Behavioral:  The patient is not nervous/anxious.        Objective   /87 (Patient Position: Sitting)   Pulse 73   Ht 1.727 m (5' 8\")   Wt 100 kg (221 lb 8 oz)   BMI 33.68 kg/m²     Physical Exam  Cardiovascular:      Rate and Rhythm: Normal rate and regular rhythm.   Neurological:      Mental Status: She is alert and oriented to person, place, and time.         Assessment/Plan   Problem List Items Addressed This Visit    None  Visit Diagnoses         Codes      Primary hypertension     I10    Relevant Medications    hydroCHLOROthiazide (HYDRODiuril) 25 mg tablet      Gastroesophageal reflux disease without esophagitis     K21.9    Relevant Medications    omeprazole (PriLOSEC) 20 mg DR capsule             Hypertension  -Continue valsartan 80mg daily   -Continue hydrochlorothiazide 25 mg daily     GERD  -Continue Omeprazole 20 mg daily   "

## 2025-05-16 ENCOUNTER — PATIENT OUTREACH (OUTPATIENT)
Dept: PRIMARY CARE | Facility: CLINIC | Age: 35
End: 2025-05-16
Payer: MEDICAID

## 2025-08-07 ENCOUNTER — TELEMEDICINE (OUTPATIENT)
Dept: PRIMARY CARE | Facility: CLINIC | Age: 35
End: 2025-08-07
Payer: MEDICAID

## 2025-08-07 DIAGNOSIS — K04.7 DENTAL INFECTION: Primary | ICD-10-CM

## 2025-08-07 PROCEDURE — 99214 OFFICE O/P EST MOD 30 MIN: CPT | Performed by: FAMILY MEDICINE

## 2025-08-07 RX ORDER — CLINDAMYCIN HYDROCHLORIDE 300 MG/1
300 CAPSULE ORAL 3 TIMES DAILY
Qty: 21 CAPSULE | Refills: 0 | Status: SHIPPED | OUTPATIENT
Start: 2025-08-07 | End: 2025-08-14

## 2025-08-07 RX ORDER — TRIAMCINOLONE ACETONIDE 1 MG/G
CREAM TOPICAL
COMMUNITY
Start: 2025-06-24

## 2025-08-07 NOTE — PROGRESS NOTES
"I performed this visit using real-time telehealth tools, including an audio/video OR telephone connection between the patient listed who was located in the [facility] [place] and myself, Nat Amador (Board certified in the [facility]).At the start of the visit, I introduced myself as Dr. Bryan and verified the patients name, , and current physical location.  If they were currently outside of the state of [place], the visit was ended and the patient was referred to alternative means for evaluation and treatment.  The patient was made aware of the limitations of the telehealth visit.  They will not be physically examined and all issues may not be appropriate for a telehealth visit.  If necessary, an in-    In preparing for this visit and writing this note, I reviewed previous electronic medical records (labs, imaging and medical charts) of the patient available in the physician portal. Significant findings which helped in decision making are recorded in this encounter charting.  All allergies were reviewed with the patient and all medications reconciled verbally with the patient at the beginning oft the visit.    Chief complaint:   \"I have a tooth infection… the tooth is getting taken out on the … my dentist prescribed me 500mg penicillin and it did not work. I just called her and she told me to get a hold of my doctor to see if they could prescribe me a stronger anabiotic. My doctor is currently out of the office until the .\"    Went to dentist 1 month ago-- given PCN 4x a day for dental infection--felt a little better while on the antibiotics--- did not totally resolve  Lower back molar-- the one in front of that is broken-- both are getting taken out  Gets bad taste in mouth and can see purulent drainage from it for awhile- 4-5 months has been getting pus out of it--  No trismus--  No fever chills aches  No neck LAD  Eating and drinking ok    All other ROS (-)    General appearance:  Vitals available " from patient? no  Alert, oriented, pleasant, in no apparent distress? yes  Answers questions appropriately? yes  Eyes clear? yes  Is patient in respiratory distress? no  Throat exam: not available  Is patient coughing during consult: no  Audible wheezing noted? : no  Pt sounds congested?:  no  Sniffing or rhinorrhea?:  no  No LAD in neck  No swelling of face or neck  Psychiatric: Affect normal? Yes  No trismus  Other relevant physical exam:    Assessment and Plan:  Dental infection--not relieved despite PND-- with do clindamycin  Has appt on the 19th with dentist for tooth removal--  Clindamycin 300mg TID x 7 days-- discussed diarrhea and risk of C. Diff  Discussed risks associated with dental infections and risk for seeding of the bloodstream  Follow up if worsens or persists              Discussed with patient during visit  differential diagnosis; viral versus bacterial infection;  (if relevant); use of medications prescribed and possible side effects; appropriate over-the-counter medications; possible complications ; when to follow-up for in person evaluation.  All patient's questions were answered. Patient has good decision making capacity.  They are alert to person, place, time and situation.  Patient has the ability to communicate choice, understand information, consequences, and reason rationally.  If sent for in person care at  or ED,    I explained why Urgent in person exam was necessary and that failure to have further evaluation, and treatment today may lead to, negative outcomes, permanent disability, or even death.   If not sent for in person care today,   follow-up with your PCP in 2-3 days, sooner if not  improving.    Follow-up immediately if symptoms worsen.   If experiencing symptoms including but not limited to lethargy / chest pain / weakness / dizziness / difficulty breathing please call 911 or go to the closest emergency department for immediate care.   Limitations to telemedicine include  inability to do a complete and accurate physical exam.    Any concerns regarding this were conveyed with the patient and in person follow-up recommended if the nature of their concern/illness does not progress as anticipated during this visit.

## 2025-08-07 NOTE — PATIENT INSTRUCTIONS
"Clindamycin as written  Follow up with dentist as scheduled--sooner if worsens    Please send me a DOZt message if you have any questions or concerns.  FOR NON URGENT questions only.  Allow up to 72 hours for response.    If you have prescription issues or other questions you can email   Keon Dow, [facility] Health Coordinator, at   [email or web address]     Rest and drink plenty of fluids    Tylenol and or motrin as needed for pain and fever (unless you have been told not to take these because of your personal medical history)    Discussed options and precautions (complaint specific and may include)  Viral versus bacterial infection; use of medications; possible side effects; appropriate over-the-counter medications; possible complications and /or when to follow-up.    if sent for in person care at  or ED,  it was explained why and failure to have \"higher level of care\" further evaluation, and treatment today may lead, but is not limited to negative outcomes, permanent disability, or even death.     All red flags requiring in person care were discussed.    If not sent for in person care today, follow-up with your PCP in 2-3 days, sooner if not  improving.    Follow-up immediately if symptoms worsen. If experiencing symptoms including but not limited to lethargy / chest pain / weakness / dizziness / difficulty breathing please call 911 or go to the emergency department for immediate care.     All patient's questions were answered. Patient has good decision making capacity.  They are alert to person, place, time and situation.  Patient has the ability to communicate choice, understand information, consequences, and reason rationally.      ____________________________________________________________________________________________________________  To connect with a new PCP please visit [email or web address] or call [phone]                          "

## 2025-08-25 ENCOUNTER — APPOINTMENT (OUTPATIENT)
Dept: SURGERY | Facility: CLINIC | Age: 35
End: 2025-08-25
Payer: MEDICAID

## 2025-08-25 VITALS
DIASTOLIC BLOOD PRESSURE: 84 MMHG | WEIGHT: 214.6 LBS | HEART RATE: 69 BPM | HEIGHT: 68 IN | SYSTOLIC BLOOD PRESSURE: 146 MMHG | BODY MASS INDEX: 32.52 KG/M2

## 2025-08-25 DIAGNOSIS — K42.9 UMBILICAL HERNIA WITHOUT OBSTRUCTION AND WITHOUT GANGRENE: Primary | ICD-10-CM

## 2025-08-25 PROCEDURE — 1036F TOBACCO NON-USER: CPT | Performed by: SURGERY

## 2025-08-25 PROCEDURE — 3077F SYST BP >= 140 MM HG: CPT | Performed by: SURGERY

## 2025-08-25 PROCEDURE — 99213 OFFICE O/P EST LOW 20 MIN: CPT | Performed by: SURGERY

## 2025-08-25 PROCEDURE — 3079F DIAST BP 80-89 MM HG: CPT | Performed by: SURGERY

## 2025-08-25 PROCEDURE — 3008F BODY MASS INDEX DOCD: CPT | Performed by: SURGERY

## 2025-08-25 RX ORDER — ACETAMINOPHEN 325 MG/1
650 TABLET ORAL EVERY 6 HOURS PRN
COMMUNITY

## 2025-08-25 RX ORDER — CEFAZOLIN SODIUM 2 G/100ML
2 INJECTION, SOLUTION INTRAVENOUS ONCE
OUTPATIENT
Start: 2025-08-25 | End: 2025-08-25

## 2025-08-26 ENCOUNTER — DOCUMENTATION (OUTPATIENT)
Dept: SURGERY | Facility: CLINIC | Age: 35
End: 2025-08-26
Payer: MEDICAID

## 2025-10-16 ENCOUNTER — APPOINTMENT (OUTPATIENT)
Dept: SURGERY | Facility: CLINIC | Age: 35
End: 2025-10-16
Payer: MEDICAID